# Patient Record
Sex: MALE | Race: WHITE | NOT HISPANIC OR LATINO | Employment: UNEMPLOYED | URBAN - METROPOLITAN AREA
[De-identification: names, ages, dates, MRNs, and addresses within clinical notes are randomized per-mention and may not be internally consistent; named-entity substitution may affect disease eponyms.]

---

## 2022-08-17 ENCOUNTER — HOSPITAL ENCOUNTER (EMERGENCY)
Facility: HOSPITAL | Age: 77
Discharge: HOME | End: 2022-08-17
Attending: EMERGENCY MEDICINE
Payer: MEDICARE

## 2022-08-17 VITALS
HEART RATE: 87 BPM | HEIGHT: 71 IN | RESPIRATION RATE: 16 BRPM | OXYGEN SATURATION: 98 % | WEIGHT: 195 LBS | SYSTOLIC BLOOD PRESSURE: 164 MMHG | BODY MASS INDEX: 27.3 KG/M2 | TEMPERATURE: 99 F | DIASTOLIC BLOOD PRESSURE: 91 MMHG

## 2022-08-17 DIAGNOSIS — R33.9 URINARY RETENTION: Primary | ICD-10-CM

## 2022-08-17 LAB
BILIRUB UR QL STRIP: NEGATIVE
CLARITY UR: CLEAR
COLOR UR: YELLOW
GLUCOSE UR QL STRIP: NEGATIVE
HGB UR QL STRIP: ABNORMAL
KETONES UR QL STRIP: NEGATIVE
LEUKOCYTE ESTERASE UR QL STRIP: NEGATIVE
MICROSCOPIC COMMENT: ABNORMAL
NITRITE UR QL STRIP: NEGATIVE
PH UR STRIP: 6 [PH] (ref 5–8)
PROT UR QL STRIP: NEGATIVE
RBC #/AREA URNS HPF: 10 /HPF (ref 0–4)
SP GR UR STRIP: 1.01 (ref 1–1.03)
URN SPEC COLLECT METH UR: ABNORMAL
UROBILINOGEN UR STRIP-ACNC: NEGATIVE EU/DL
WBC #/AREA URNS HPF: 0 /HPF (ref 0–5)

## 2022-08-17 PROCEDURE — 81000 URINALYSIS NONAUTO W/SCOPE: CPT | Performed by: EMERGENCY MEDICINE

## 2022-08-17 PROCEDURE — 99283 EMERGENCY DEPT VISIT LOW MDM: CPT | Mod: 25

## 2022-08-17 PROCEDURE — 51702 INSERT TEMP BLADDER CATH: CPT

## 2022-08-17 NOTE — ED NOTES
Pt states that he has not been able to urinate since Sunday. Pt states that he has been dribbling but not intentionally

## 2022-08-17 NOTE — ED NOTES
Pt provided with sandwich and drink pts states that he has not been able to eat in the last several days due to the abd pressure

## 2022-08-17 NOTE — ED PROVIDER NOTES
Encounter Date: 8/17/2022       History     Chief Complaint   Patient presents with    Urinary Retention     Patient states for past 2 days he has been having urinary retention and dribbling.  Patient also states he has had blood in his urine.     Pt with stated hx of BPH here with LAP and urine retention. Urine issues the past week but pain and no uop since last night. No hx of same. He noticed some blood in his urine yesterday. No back pain.    The history is provided by the patient.     Review of patient's allergies indicates:  No Known Allergies  No past medical history on file.  No past surgical history on file.  No family history on file.     Review of Systems   Constitutional: Negative for chills and fever.   Gastrointestinal: Positive for abdominal pain.   Genitourinary: Positive for decreased urine volume, difficulty urinating and hematuria. Negative for flank pain and testicular pain.   All other systems reviewed and are negative.      Physical Exam     Initial Vitals [08/17/22 1102]   BP Pulse Resp Temp SpO2   (!) 164/91 87 16 98.5 °F (36.9 °C) 98 %      MAP       --         Physical Exam    Nursing note and vitals reviewed.  Constitutional: He appears well-developed and well-nourished.   uncomfortable   Eyes: No scleral icterus.   Cardiovascular: Normal rate, regular rhythm, normal heart sounds and intact distal pulses.   Pulmonary/Chest: Breath sounds normal.   Abdominal: Abdomen is soft. Bowel sounds are normal.   +ttp to suprapubic area   Musculoskeletal:         General: No tenderness or edema. Normal range of motion.     Neurological: He is alert and oriented to person, place, and time.   Skin: Skin is warm and dry. Capillary refill takes less than 2 seconds. No rash noted. No erythema. No pallor.         ED Course   Procedures  Labs Reviewed   URINALYSIS, REFLEX TO URINE CULTURE - Abnormal; Notable for the following components:       Result Value    Occult Blood UA 1+ (*)     All other components  within normal limits    Narrative:     Preferred Collection Type->Urine, Clean Catch  Specimen Source->Urine   URINALYSIS MICROSCOPIC - Abnormal; Notable for the following components:    RBC, UA 10 (*)     All other components within normal limits    Narrative:     Preferred Collection Type->Urine, Clean Catch  Specimen Source->Urine          Imaging Results    None          Medications - No data to display  Medical Decision Making:   Differential Diagnosis:   UTI, BPH  Clinical Tests:   Lab Tests: Ordered and Reviewed  ED Management:  Pt with BPH presented with urinary retention and abd pain. Freire placed with relief of his pain. UA neg for infection. Pt to f/u with urology when he gets home to [place]. Will dc with the freire and leg bag.             ED Course as of 08/17/22 1202   Wed Aug 17, 2022   1120 Freire placed. Pt with relief of his pain. Will attempt to get urology f/u. Pt visiting from [place] and supposed to go home Friday.  [DC]      ED Course User Index  [DC] Issa Catalan Jr., MD             Clinical Impression:   Final diagnoses:  [R33.9] Urinary retention (Primary)          ED Disposition Condition    Discharge Stable        ED Prescriptions     None        Follow-up Information     Follow up With Specialties Details Why Contact Info    Urology clinic  On 8/22/2022             Issa Catalan Jr., MD  08/17/22 1202

## 2022-11-21 ENCOUNTER — HOSPITAL ENCOUNTER (EMERGENCY)
Facility: HOSPITAL | Age: 77
Discharge: ANOTHER HOSPITAL | End: 2022-11-22
Attending: INTERNAL MEDICINE
Payer: MEDICARE

## 2022-11-21 DIAGNOSIS — K80.20 GALLSTONES: ICD-10-CM

## 2022-11-21 DIAGNOSIS — K92.2 UPPER GI BLEED: ICD-10-CM

## 2022-11-21 DIAGNOSIS — N20.0 CALCULOUS PYELONEPHRITIS: Primary | ICD-10-CM

## 2022-11-21 DIAGNOSIS — R11.2 NAUSEA & VOMITING: ICD-10-CM

## 2022-11-21 DIAGNOSIS — N13.2 HYDRONEPHROSIS WITH URINARY OBSTRUCTION DUE TO URETERAL CALCULUS: ICD-10-CM

## 2022-11-21 DIAGNOSIS — N32.0 BLADDER OUTLET OBSTRUCTION: ICD-10-CM

## 2022-11-21 LAB
ALBUMIN SERPL BCP-MCNC: 4.4 G/DL (ref 3.5–5.2)
ALP SERPL-CCNC: 84 U/L (ref 55–135)
ALT SERPL W/O P-5'-P-CCNC: 17 U/L (ref 10–44)
ANION GAP SERPL CALC-SCNC: 12 MMOL/L (ref 8–16)
AST SERPL-CCNC: 18 U/L (ref 10–40)
BACTERIA #/AREA URNS HPF: ABNORMAL /HPF
BASOPHILS # BLD AUTO: 0.03 K/UL (ref 0–0.2)
BASOPHILS NFR BLD: 0.2 % (ref 0–1.9)
BILIRUB SERPL-MCNC: 1 MG/DL (ref 0.1–1)
BILIRUB UR QL STRIP: NEGATIVE
BUN SERPL-MCNC: 21 MG/DL (ref 8–23)
CALCIUM SERPL-MCNC: 9.5 MG/DL (ref 8.7–10.5)
CHLORIDE SERPL-SCNC: 108 MMOL/L (ref 95–110)
CLARITY UR: CLEAR
CO2 SERPL-SCNC: 23 MMOL/L (ref 23–29)
COLOR UR: YELLOW
CREAT SERPL-MCNC: 1.5 MG/DL (ref 0.5–1.4)
DIFFERENTIAL METHOD: ABNORMAL
EOSINOPHIL # BLD AUTO: 0 K/UL (ref 0–0.5)
EOSINOPHIL NFR BLD: 0 % (ref 0–8)
ERYTHROCYTE [DISTWIDTH] IN BLOOD BY AUTOMATED COUNT: 15.5 % (ref 11.5–14.5)
EST. GFR  (NO RACE VARIABLE): 48 ML/MIN/1.73 M^2
GLUCOSE SERPL-MCNC: 113 MG/DL (ref 70–110)
GLUCOSE UR QL STRIP: NEGATIVE
HCT VFR BLD AUTO: 45.7 % (ref 40–54)
HGB BLD-MCNC: 14.9 G/DL (ref 14–18)
HGB UR QL STRIP: ABNORMAL
HYALINE CASTS #/AREA URNS LPF: 0 /LPF
IMM GRANULOCYTES # BLD AUTO: 0.07 K/UL (ref 0–0.04)
IMM GRANULOCYTES NFR BLD AUTO: 0.4 % (ref 0–0.5)
INR PPP: 1 (ref 0.8–1.2)
KETONES UR QL STRIP: ABNORMAL
LEUKOCYTE ESTERASE UR QL STRIP: ABNORMAL
LIPASE SERPL-CCNC: 13 U/L (ref 4–60)
LYMPHOCYTES # BLD AUTO: 0.5 K/UL (ref 1–4.8)
LYMPHOCYTES NFR BLD: 2.8 % (ref 18–48)
MCH RBC QN AUTO: 28.4 PG (ref 27–31)
MCHC RBC AUTO-ENTMCNC: 32.6 G/DL (ref 32–36)
MCV RBC AUTO: 87 FL (ref 82–98)
MICROSCOPIC COMMENT: ABNORMAL
MONOCYTES # BLD AUTO: 0.9 K/UL (ref 0.3–1)
MONOCYTES NFR BLD: 5.6 % (ref 4–15)
NEUTROPHILS # BLD AUTO: 14.7 K/UL (ref 1.8–7.7)
NEUTROPHILS NFR BLD: 91 % (ref 38–73)
NITRITE UR QL STRIP: POSITIVE
NRBC BLD-RTO: 0 /100 WBC
PH UR STRIP: 7 [PH] (ref 5–8)
PLATELET # BLD AUTO: 273 K/UL (ref 150–450)
PMV BLD AUTO: 11.1 FL (ref 9.2–12.9)
POTASSIUM SERPL-SCNC: 4.4 MMOL/L (ref 3.5–5.1)
PROT SERPL-MCNC: 7.1 G/DL (ref 6–8.4)
PROT UR QL STRIP: ABNORMAL
PROTHROMBIN TIME: 10.9 SEC (ref 9–12.5)
RBC # BLD AUTO: 5.25 M/UL (ref 4.6–6.2)
RBC #/AREA URNS HPF: 20 /HPF (ref 0–4)
SODIUM SERPL-SCNC: 143 MMOL/L (ref 136–145)
SP GR UR STRIP: 1.02 (ref 1–1.03)
URN SPEC COLLECT METH UR: ABNORMAL
UROBILINOGEN UR STRIP-ACNC: NEGATIVE EU/DL
WBC # BLD AUTO: 16.2 K/UL (ref 3.9–12.7)
WBC #/AREA URNS HPF: 80 /HPF (ref 0–5)

## 2022-11-21 PROCEDURE — 76705 ECHO EXAM OF ABDOMEN: CPT | Mod: TC

## 2022-11-21 PROCEDURE — 76705 US ABDOMEN LIMITED: ICD-10-PCS | Mod: 26,,, | Performed by: RADIOLOGY

## 2022-11-21 PROCEDURE — 87086 URINE CULTURE/COLONY COUNT: CPT | Performed by: INTERNAL MEDICINE

## 2022-11-21 PROCEDURE — 25000003 PHARM REV CODE 250: Performed by: EMERGENCY MEDICINE

## 2022-11-21 PROCEDURE — 87077 CULTURE AEROBIC IDENTIFY: CPT | Performed by: INTERNAL MEDICINE

## 2022-11-21 PROCEDURE — 85025 COMPLETE CBC W/AUTO DIFF WBC: CPT | Performed by: INTERNAL MEDICINE

## 2022-11-21 PROCEDURE — 87186 SC STD MICRODIL/AGAR DIL: CPT | Mod: 59 | Performed by: INTERNAL MEDICINE

## 2022-11-21 PROCEDURE — 74176 CT RENAL STONE STUDY ABD PELVIS WO: ICD-10-PCS | Mod: 26,,, | Performed by: RADIOLOGY

## 2022-11-21 PROCEDURE — 93005 ELECTROCARDIOGRAM TRACING: CPT

## 2022-11-21 PROCEDURE — 81000 URINALYSIS NONAUTO W/SCOPE: CPT | Performed by: INTERNAL MEDICINE

## 2022-11-21 PROCEDURE — 76705 ECHO EXAM OF ABDOMEN: CPT | Mod: 26,,, | Performed by: RADIOLOGY

## 2022-11-21 PROCEDURE — 74176 CT ABD & PELVIS W/O CONTRAST: CPT | Mod: 26,,, | Performed by: RADIOLOGY

## 2022-11-21 PROCEDURE — 93010 ELECTROCARDIOGRAM REPORT: CPT | Mod: ,,, | Performed by: INTERNAL MEDICINE

## 2022-11-21 PROCEDURE — 63600175 PHARM REV CODE 636 W HCPCS: Performed by: INTERNAL MEDICINE

## 2022-11-21 PROCEDURE — 93010 EKG 12-LEAD: ICD-10-PCS | Mod: ,,, | Performed by: INTERNAL MEDICINE

## 2022-11-21 PROCEDURE — 96365 THER/PROPH/DIAG IV INF INIT: CPT

## 2022-11-21 PROCEDURE — 96361 HYDRATE IV INFUSION ADD-ON: CPT

## 2022-11-21 PROCEDURE — 85610 PROTHROMBIN TIME: CPT | Performed by: INTERNAL MEDICINE

## 2022-11-21 PROCEDURE — 71045 XR CHEST AP PORTABLE: ICD-10-PCS | Mod: 26,,, | Performed by: RADIOLOGY

## 2022-11-21 PROCEDURE — 74176 CT ABD & PELVIS W/O CONTRAST: CPT | Mod: TC

## 2022-11-21 PROCEDURE — 87088 URINE BACTERIA CULTURE: CPT | Performed by: INTERNAL MEDICINE

## 2022-11-21 PROCEDURE — 80053 COMPREHEN METABOLIC PANEL: CPT | Performed by: INTERNAL MEDICINE

## 2022-11-21 PROCEDURE — 25000003 PHARM REV CODE 250: Performed by: INTERNAL MEDICINE

## 2022-11-21 PROCEDURE — C9113 INJ PANTOPRAZOLE SODIUM, VIA: HCPCS | Performed by: INTERNAL MEDICINE

## 2022-11-21 PROCEDURE — 86803 HEPATITIS C AB TEST: CPT | Performed by: EMERGENCY MEDICINE

## 2022-11-21 PROCEDURE — 83690 ASSAY OF LIPASE: CPT | Performed by: INTERNAL MEDICINE

## 2022-11-21 PROCEDURE — 87389 HIV-1 AG W/HIV-1&-2 AB AG IA: CPT | Performed by: EMERGENCY MEDICINE

## 2022-11-21 PROCEDURE — 71045 X-RAY EXAM CHEST 1 VIEW: CPT | Mod: 26,,, | Performed by: RADIOLOGY

## 2022-11-21 PROCEDURE — 71045 X-RAY EXAM CHEST 1 VIEW: CPT | Mod: TC

## 2022-11-21 PROCEDURE — 96375 TX/PRO/DX INJ NEW DRUG ADDON: CPT

## 2022-11-21 PROCEDURE — 99285 EMERGENCY DEPT VISIT HI MDM: CPT | Mod: 25

## 2022-11-21 RX ORDER — PANTOPRAZOLE SODIUM 40 MG/10ML
40 INJECTION, POWDER, LYOPHILIZED, FOR SOLUTION INTRAVENOUS
Status: COMPLETED | OUTPATIENT
Start: 2022-11-21 | End: 2022-11-21

## 2022-11-21 RX ORDER — ONDANSETRON 2 MG/ML
4 INJECTION INTRAMUSCULAR; INTRAVENOUS
Status: COMPLETED | OUTPATIENT
Start: 2022-11-21 | End: 2022-11-21

## 2022-11-21 RX ORDER — TAMSULOSIN HYDROCHLORIDE 0.4 MG/1
CAPSULE ORAL DAILY
COMMUNITY

## 2022-11-21 RX ORDER — ACETAMINOPHEN 500 MG
1000 TABLET ORAL
Status: COMPLETED | OUTPATIENT
Start: 2022-11-21 | End: 2022-11-21

## 2022-11-21 RX ADMIN — SODIUM CHLORIDE 1000 ML: 0.9 INJECTION, SOLUTION INTRAVENOUS at 03:11

## 2022-11-21 RX ADMIN — ACETAMINOPHEN 1000 MG: 500 TABLET ORAL at 06:11

## 2022-11-21 RX ADMIN — PANTOPRAZOLE SODIUM 40 MG: 40 INJECTION, POWDER, FOR SOLUTION INTRAVENOUS at 04:11

## 2022-11-21 RX ADMIN — ONDANSETRON 4 MG: 2 INJECTION INTRAMUSCULAR; INTRAVENOUS at 03:11

## 2022-11-21 RX ADMIN — CEFTRIAXONE 1 G: 1 INJECTION, SOLUTION INTRAVENOUS at 04:11

## 2022-11-21 NOTE — ED NOTES
Care assumed of pt. Pt Aox4; respirations even, unlabored. Pt c/o RLQ abd pain. Burgos catheter in place. No drainage bag in place. Pt has been using cap to plug end of catheter. States he empties catheter every couple of hours. Pt also reports 1 episode of dark red vomit. W/u in progress.

## 2022-11-21 NOTE — ED TRIAGE NOTES
Pt c/o RLQ abdominal pain with 1 episode of vomiting dark red blood. Pt states had lithotripsy 1 week ago. Currently has indwelling freire cath

## 2022-11-21 NOTE — ED PROVIDER NOTES
Encounter Date: 11/21/2022       History     Chief Complaint   Patient presents with    Abdominal Pain    Hematemesis     Patient comes in complaining of right flank and groin pain that occurred this morning.  He had episode of nausea vomiting with blood in the vomitus.  No history of any bowel movements of blood or history of ulcer disease.  Patient has indwelling Burgos catheter because of BPH and urinary obstruction.      The patient is from [place], he has been followed by Neurology there for obstructive uropathy as well as right kidney stones.  The patient was diagnosed with right renal 6 mm stone 2 weeks ago by his urologist there.  Even those in the kidney he still underwent lithotripsy on last Tuesday.  He states he may have seen fragments but he does not know and has no follow-up since then.  He has been offered surgery by his urologist including UroLift which he is option to take and pursue in the future.  There is no history any fever chills and he has been off antibiotics for over 1 week.      Review of patient's allergies indicates:  No Known Allergies  History reviewed. No pertinent past medical history.  History reviewed. No pertinent surgical history.  History reviewed. No pertinent family history.  Social History     Tobacco Use    Smoking status: Never    Smokeless tobacco: Never   Substance Use Topics    Alcohol use: Not Currently    Drug use: Never     Review of Systems   Constitutional:  Negative for fever.   HENT:  Negative for sore throat.    Respiratory:  Negative for shortness of breath.    Cardiovascular:  Negative for chest pain.   Gastrointestinal:  Negative for nausea.   Genitourinary:  Negative for dysuria.   Musculoskeletal:  Negative for back pain.   Skin:  Negative for rash.   Neurological:  Negative for weakness.   Hematological:  Does not bruise/bleed easily.     Physical Exam     Initial Vitals [11/21/22 1510]   BP Pulse Resp Temp SpO2   (!) 148/76 75 16 97.8 °F (36.6 °C) 98 %       MAP       --         Physical Exam    Nursing note and vitals reviewed.  Constitutional: He appears well-developed.   HENT:   Head: Normocephalic.   Eyes: Pupils are equal, round, and reactive to light.   Neck:   Normal range of motion.  Cardiovascular:  Normal rate.           Pulmonary/Chest: Breath sounds normal.   Abdominal: Abdomen is soft and protuberant. Bowel sounds are decreased. There is abdominal tenderness.   Patient with indwelling Burgos catheter   There is right CVA tenderness.There is guarding. There is no rebound.   Musculoskeletal:         General: Normal range of motion.      Cervical back: Normal range of motion.     Neurological: He is alert. He has normal reflexes.   Skin: Skin is warm.       ED Course   Procedures  Labs Reviewed   CBC W/ AUTO DIFFERENTIAL - Abnormal; Notable for the following components:       Result Value    WBC 16.20 (*)     RDW 15.5 (*)     Gran # (ANC) 14.7 (*)     Immature Grans (Abs) 0.07 (*)     Lymph # 0.5 (*)     Gran % 91.0 (*)     Lymph % 2.8 (*)     All other components within normal limits    Narrative:     Release to patient->Immediate   COMPREHENSIVE METABOLIC PANEL - Abnormal; Notable for the following components:    Glucose 113 (*)     Creatinine 1.5 (*)     eGFR 48.0 (*)     All other components within normal limits    Narrative:     Release to patient->Immediate   URINALYSIS, REFLEX TO URINE CULTURE - Abnormal; Notable for the following components:    Protein, UA 1+ (*)     Ketones, UA 2+ (*)     Occult Blood UA 2+ (*)     Nitrite, UA Positive (*)     Leukocytes, UA 1+ (*)     All other components within normal limits    Narrative:     Preferred Collection Type->Urine, Clean Catch  Specimen Source->Urine   URINALYSIS MICROSCOPIC - Abnormal; Notable for the following components:    RBC, UA 20 (*)     WBC, UA 80 (*)     Bacteria Many (*)     All other components within normal limits    Narrative:     Preferred Collection Type->Urine, Clean Catch  Specimen  Source->Urine   CULTURE, URINE   LIPASE    Narrative:     Release to patient->Immediate   PROTIME-INR    Narrative:     Release to patient->Immediate   HIV 1 / 2 ANTIBODY   HEPATITIS C ANTIBODY        ECG Results              EKG 12-lead (Preliminary result)  Result time 11/21/22 15:52:51      ED Interpretation by Terell Hamm MD (11/21/22 15:52:51, [facility], Emergency Medicine)    First-degree AV block with a heart rate of 80 and no acute ischemic changes                                  Imaging Results              US Abdomen Limited (Final result)  Result time 11/21/22 16:32:02      Final result by Cheikh Trotter MD (11/21/22 16:32:02)                   Impression:      1. Cholelithiasis without sonographic evidence to suggest acute cholecystitis.  2. Mild right-sided hydronephrosis.  3. Poor visualization of the pancreas and IVC.      Electronically signed by: Cheikh Trotter  Date:    11/21/2022  Time:    16:32               Narrative:    EXAMINATION:  US ABDOMEN LIMITED    CLINICAL HISTORY:  Gallstones;    TECHNIQUE:  Limited ultrasound of the right upper quadrant of the abdomen (including pancreas, liver, gallbladder, common bile duct, and right kidney) was performed.    COMPARISON:  CT same day.    FINDINGS:  Liver: Normal in size, measuring 18.0 cm. Homogeneous echotexture. No focal hepatic lesions.    Gallbladder: Normally distended with multiple calcified gallstones.  No gallbladder wall thickening or pericholecystic fluid.  No sonographic Valles's sign.    Pancreas: Obscured by overlying bowel gas.    Biliary system: The common duct is not dilated, measuring 4.0 mm.  No intrahepatic ductal dilatation.    Right kidney: Normal in size and echogenicity measuring 11.3 centimeters.  Mild changes of hydronephrosis.    IVC is obscured by overlying bowel gas.    Miscellaneous: No upper abdominal ascites.                                       X-Ray Chest AP Portable (Final result)  Result  time 11/21/22 15:48:14      Final result by Cheikh Trotter MD (11/21/22 15:48:14)                   Impression:      No acute chest disease.      Electronically signed by: Cheikh Trotter  Date:    11/21/2022  Time:    15:48               Narrative:    EXAMINATION:  XR CHEST AP PORTABLE    CLINICAL HISTORY:  Vomiting;    TECHNIQUE:  Portable view of the chest was performed.    COMPARISON:  None.    FINDINGS:  Lungs are clear.  No focal consolidation.  Heart size normal.  Mediastinal contours unremarkable.  Trachea midline.    Bony thorax intact.                                        CT Renal Stone Study ABD Pelvis WO (Final result)  Result time 11/21/22 15:53:29      Final result by Cheikh Trotter MD (11/21/22 15:53:29)                   Impression:      1. Small hiatal hernia.  2. Cholelithiasis.  Further evaluation with ultrasound as deemed clinically necessary.  3. Previous left adrenalectomy.  4. Moderate right-sided hydronephrosis and hydroureter secondary to partially obstructing adjacent distal right ureteral calculi.  5. Small nonobstructing right renal calculi.  6.  Mild right perinephric and periureteric inflammatory change may represent underlying UTI/pyelonephritis.  Correlate clinically with UA.  7. Diverticulosis.  8. Significant prostatic hypertrophy.  Further evaluation as deemed clinically necessary.  9. Burgos catheter.  This report was flagged in Epic as abnormal.      Electronically signed by: Cheikh Trotter  Date:    11/21/2022  Time:    15:53               Narrative:    EXAMINATION:  CT RENAL STONE STUDY ABD PELVIS WO    CLINICAL HISTORY:  Bladder-neck obstruction;    TECHNIQUE:  Low dose axial images, sagittal and coronal reformations were obtained from the lung bases to the pubic symphysis.  Contrast was not administered.    COMPARISON:  None    FINDINGS:  There is a small hiatal hernia.  The liver and spleen are normal in size and attenuation.  The gallbladder is distended with  multiple calcified gallstones.  The pancreas and right adrenal gland are unremarkable.  Surgical clips from previous left adrenalectomy.    Kidneys are normal in size and attenuation.  There is moderate right-sided hydronephrosis with hydroureter secondary to partially obstructing adjacent 5 and 6 mm distal right ureteral calculi.  There are small nonobstructing right renal calculi measuring 4-5 mm in diameter.  Mild right perinephric and right periureteric inflammatory change.  No left renal calculi.  No left-sided changes of hydronephrosis.  The left ureter is normal in course and caliber.    Air and stool throughout the colon and rectum.  Numerous diverticula throughout predominantly the distal colon.  No mesenteric inflammatory change.  The appendix is normal in caliber.    The bladder is partially distended with a Burgos catheter in place.  The prostate is significantly enlarged with coarse peripheral calcification measuring 5.9 cm AP x 6.3 cm transverse.  This results in elevation of the bladder floor.  Seminal vesicles and rectum unremarkable.    There is no significant mesenteric or retroperitoneal lymphadenopathy.                                       Medications   pantoprazole injection 40 mg (has no administration in time range)   sodium chloride 0.9% bolus 1,000 mL (0 mLs Intravenous Stopped 11/21/22 1630)   ondansetron injection 4 mg (4 mg Intravenous Given 11/21/22 1531)   cefTRIAXone (ROCEPHIN) 1 g/50 mL D5W IVPB (0 g Intravenous Stopped 11/21/22 1639)     Medical Decision Making:   ED Management:  Patient with CT scan showing both gallstones with a dilated gallbladder, pyelonephritis, right hydronephrosis with obstruction, with labs suggesting cystitis as well.  Because patient had recent lithotripsy less than 1 week ago, and patient is obstructed with infection will need to transfer to Urology.  Will also need to see surgery for gallbladder which may also be infected as well.    Patient care assumed  from Dr. Hamm at shift change to await acceptance at another facility, where patient will need nephrology services and possibly General surgery or GI.  Over the course of patient's stay here, under my care, I spoke to Urology at [facility] who agreed to see the patient in consult, but wanted the patient admitted to Hospital Medicine.  Eventually there were no beds available.  I did eventually speak to Dr.Charit Wise who arranged to have the patient admitted [facility].  Under my care, the patient has been stable and pain-free.  He did receive IV antibiotics and IV fluids.           ED Course as of 11/21/22 1646   Mon Nov 21, 2022   1543 CBC auto differential(!) [PW]   1551 Urinalysis, Reflex to Urine Culture Urine, Clean Catch(!) [PW]   1551 RBC, UA(!): 20 [PW]   1551 WBC, UA(!): 80 [PW]   1551 Bacteria, UA(!): Many [PW]   1551 NITRITE UA(!): Positive [PW]   1551 Leukocytes, UA(!): 1+ [PW]   1552 X-Ray Chest AP Portable [PW]   1552 EKG 12-lead [PW]   1559 CT Renal Stone Study ABD Pelvis WO(!) [PW]   1559 Protime: 10.9 [PW]   1559 INR: 1.0 [PW]   1603 Lipase: 13 [PW]   1603 Comprehensive metabolic panel(!) [PW]   1639 CT Renal Stone Study ABD Pelvis WO(!) [PW]      ED Course User Index  [PW] Terell Hmam MD                   Clinical Impression:   Final diagnoses:  [R11.2] Nausea & vomiting  [N13.2] Hydronephrosis with urinary obstruction due to ureteral calculus  [N20.0] Calculous pyelonephritis (Primary)  [N32.0] Bladder outlet obstruction  [K80.20] Gallstones  [K92.2] Upper GI bleed      ED Disposition Condition    Transfer to Another Facility Stable                Pal Dan MD  11/22/22 9602

## 2022-11-22 ENCOUNTER — HOSPITAL ENCOUNTER (INPATIENT)
Facility: HOSPITAL | Age: 77
LOS: 2 days | Discharge: HOME | DRG: 661 | End: 2022-11-24
Attending: STUDENT IN AN ORGANIZED HEALTH CARE EDUCATION/TRAINING PROGRAM | Admitting: STUDENT IN AN ORGANIZED HEALTH CARE EDUCATION/TRAINING PROGRAM
Payer: MEDICARE

## 2022-11-22 ENCOUNTER — ANESTHESIA EVENT (OUTPATIENT)
Dept: SURGERY | Facility: HOSPITAL | Age: 77
DRG: 661 | End: 2022-11-22
Payer: MEDICARE

## 2022-11-22 ENCOUNTER — ANESTHESIA (OUTPATIENT)
Dept: SURGERY | Facility: HOSPITAL | Age: 77
DRG: 661 | End: 2022-11-22
Payer: MEDICARE

## 2022-11-22 VITALS
HEART RATE: 77 BPM | TEMPERATURE: 99 F | SYSTOLIC BLOOD PRESSURE: 112 MMHG | WEIGHT: 190 LBS | HEIGHT: 71 IN | RESPIRATION RATE: 16 BRPM | DIASTOLIC BLOOD PRESSURE: 45 MMHG | OXYGEN SATURATION: 97 % | BODY MASS INDEX: 26.6 KG/M2

## 2022-11-22 DIAGNOSIS — N13.9 OBSTRUCTIVE UROPATHY: Primary | ICD-10-CM

## 2022-11-22 DIAGNOSIS — N12 PYELONEPHRITIS: ICD-10-CM

## 2022-11-22 DIAGNOSIS — R07.9 CHEST PAIN: ICD-10-CM

## 2022-11-22 PROBLEM — N20.0 NEPHROLITHIASIS: Status: ACTIVE | Noted: 2022-11-22

## 2022-11-22 PROBLEM — N17.9 AKI (ACUTE KIDNEY INJURY): Status: ACTIVE | Noted: 2022-11-22

## 2022-11-22 PROBLEM — E78.5 HYPERLIPIDEMIA: Status: ACTIVE | Noted: 2022-11-22

## 2022-11-22 LAB
ABO + RH BLD: NORMAL
ANION GAP SERPL CALC-SCNC: 9 MMOL/L (ref 8–16)
BASOPHILS # BLD AUTO: 0.02 K/UL (ref 0–0.2)
BASOPHILS NFR BLD: 0.2 % (ref 0–1.9)
BLD GP AB SCN CELLS X3 SERPL QL: NORMAL
BUN SERPL-MCNC: 23 MG/DL (ref 8–23)
CALCIUM SERPL-MCNC: 8.2 MG/DL (ref 8.7–10.5)
CHLORIDE SERPL-SCNC: 110 MMOL/L (ref 95–110)
CO2 SERPL-SCNC: 21 MMOL/L (ref 23–29)
CREAT SERPL-MCNC: 1.9 MG/DL (ref 0.5–1.4)
DIFFERENTIAL METHOD: ABNORMAL
EOSINOPHIL # BLD AUTO: 0 K/UL (ref 0–0.5)
EOSINOPHIL NFR BLD: 0 % (ref 0–8)
ERYTHROCYTE [DISTWIDTH] IN BLOOD BY AUTOMATED COUNT: 16 % (ref 11.5–14.5)
EST. GFR  (NO RACE VARIABLE): 36 ML/MIN/1.73 M^2
GLUCOSE SERPL-MCNC: 116 MG/DL (ref 70–110)
HCT VFR BLD AUTO: 40.2 % (ref 40–54)
HCV AB SERPL QL IA: NORMAL
HGB BLD-MCNC: 12.8 G/DL (ref 14–18)
HIV 1+2 AB+HIV1 P24 AG SERPL QL IA: NORMAL
IMM GRANULOCYTES # BLD AUTO: 0.04 K/UL (ref 0–0.04)
IMM GRANULOCYTES NFR BLD AUTO: 0.3 % (ref 0–0.5)
LYMPHOCYTES # BLD AUTO: 0.2 K/UL (ref 1–4.8)
LYMPHOCYTES NFR BLD: 1.4 % (ref 18–48)
MAGNESIUM SERPL-MCNC: 1.6 MG/DL (ref 1.6–2.6)
MCH RBC QN AUTO: 27.8 PG (ref 27–31)
MCHC RBC AUTO-ENTMCNC: 31.8 G/DL (ref 32–36)
MCV RBC AUTO: 87 FL (ref 82–98)
MONOCYTES # BLD AUTO: 0.8 K/UL (ref 0.3–1)
MONOCYTES NFR BLD: 6.7 % (ref 4–15)
NEUTROPHILS # BLD AUTO: 10.8 K/UL (ref 1.8–7.7)
NEUTROPHILS NFR BLD: 91.4 % (ref 38–73)
NRBC BLD-RTO: 0 /100 WBC
PHOSPHATE SERPL-MCNC: 2.4 MG/DL (ref 2.7–4.5)
PLATELET # BLD AUTO: 203 K/UL (ref 150–450)
PMV BLD AUTO: 11.4 FL (ref 9.2–12.9)
POTASSIUM SERPL-SCNC: 4.1 MMOL/L (ref 3.5–5.1)
RBC # BLD AUTO: 4.61 M/UL (ref 4.6–6.2)
SODIUM SERPL-SCNC: 140 MMOL/L (ref 136–145)
WBC # BLD AUTO: 11.87 K/UL (ref 3.9–12.7)

## 2022-11-22 PROCEDURE — 25000003 PHARM REV CODE 250: Performed by: UROLOGY

## 2022-11-22 PROCEDURE — 37000009 HC ANESTHESIA EA ADD 15 MINS: Performed by: UROLOGY

## 2022-11-22 PROCEDURE — 36000707: Performed by: UROLOGY

## 2022-11-22 PROCEDURE — 71000033 HC RECOVERY, INTIAL HOUR: Performed by: UROLOGY

## 2022-11-22 PROCEDURE — 63600175 PHARM REV CODE 636 W HCPCS: Performed by: STUDENT IN AN ORGANIZED HEALTH CARE EDUCATION/TRAINING PROGRAM

## 2022-11-22 PROCEDURE — D9220A PRA ANESTHESIA: ICD-10-PCS | Mod: CRNA,,, | Performed by: STUDENT IN AN ORGANIZED HEALTH CARE EDUCATION/TRAINING PROGRAM

## 2022-11-22 PROCEDURE — 25000003 PHARM REV CODE 250: Performed by: STUDENT IN AN ORGANIZED HEALTH CARE EDUCATION/TRAINING PROGRAM

## 2022-11-22 PROCEDURE — 36415 COLL VENOUS BLD VENIPUNCTURE: CPT | Performed by: STUDENT IN AN ORGANIZED HEALTH CARE EDUCATION/TRAINING PROGRAM

## 2022-11-22 PROCEDURE — 36000706: Performed by: UROLOGY

## 2022-11-22 PROCEDURE — 99223 1ST HOSP IP/OBS HIGH 75: CPT | Mod: 25,,, | Performed by: UROLOGY

## 2022-11-22 PROCEDURE — 94799 UNLISTED PULMONARY SVC/PX: CPT

## 2022-11-22 PROCEDURE — 84100 ASSAY OF PHOSPHORUS: CPT | Performed by: STUDENT IN AN ORGANIZED HEALTH CARE EDUCATION/TRAINING PROGRAM

## 2022-11-22 PROCEDURE — 86850 RBC ANTIBODY SCREEN: CPT | Performed by: STUDENT IN AN ORGANIZED HEALTH CARE EDUCATION/TRAINING PROGRAM

## 2022-11-22 PROCEDURE — D9220A PRA ANESTHESIA: Mod: ANES,,, | Performed by: ANESTHESIOLOGY

## 2022-11-22 PROCEDURE — C1769 GUIDE WIRE: HCPCS | Performed by: UROLOGY

## 2022-11-22 PROCEDURE — 37000008 HC ANESTHESIA 1ST 15 MINUTES: Performed by: UROLOGY

## 2022-11-22 PROCEDURE — 99223 PR INITIAL HOSPITAL CARE,LEVL III: ICD-10-PCS | Mod: 25,,, | Performed by: UROLOGY

## 2022-11-22 PROCEDURE — 52332 CYSTOSCOPY AND TREATMENT: CPT | Mod: RT,,, | Performed by: UROLOGY

## 2022-11-22 PROCEDURE — 25000003 PHARM REV CODE 250: Performed by: EMERGENCY MEDICINE

## 2022-11-22 PROCEDURE — C2617 STENT, NON-COR, TEM W/O DEL: HCPCS | Performed by: UROLOGY

## 2022-11-22 PROCEDURE — 52332 PR CYSTOSCOPY,INSERT URETERAL STENT: ICD-10-PCS | Mod: RT,,, | Performed by: UROLOGY

## 2022-11-22 PROCEDURE — 85025 COMPLETE CBC W/AUTO DIFF WBC: CPT | Performed by: STUDENT IN AN ORGANIZED HEALTH CARE EDUCATION/TRAINING PROGRAM

## 2022-11-22 PROCEDURE — 63600175 PHARM REV CODE 636 W HCPCS: Performed by: ANESTHESIOLOGY

## 2022-11-22 PROCEDURE — 63600175 PHARM REV CODE 636 W HCPCS: Performed by: UROLOGY

## 2022-11-22 PROCEDURE — 83735 ASSAY OF MAGNESIUM: CPT | Performed by: STUDENT IN AN ORGANIZED HEALTH CARE EDUCATION/TRAINING PROGRAM

## 2022-11-22 PROCEDURE — 11000001 HC ACUTE MED/SURG PRIVATE ROOM

## 2022-11-22 PROCEDURE — 80048 BASIC METABOLIC PNL TOTAL CA: CPT | Performed by: STUDENT IN AN ORGANIZED HEALTH CARE EDUCATION/TRAINING PROGRAM

## 2022-11-22 PROCEDURE — D9220A PRA ANESTHESIA: ICD-10-PCS | Mod: ANES,,, | Performed by: ANESTHESIOLOGY

## 2022-11-22 PROCEDURE — D9220A PRA ANESTHESIA: Mod: CRNA,,, | Performed by: STUDENT IN AN ORGANIZED HEALTH CARE EDUCATION/TRAINING PROGRAM

## 2022-11-22 PROCEDURE — 27000221 HC OXYGEN, UP TO 24 HOURS

## 2022-11-22 DEVICE — STENT URET PERCUFLEX 6FR 26CM: Type: IMPLANTABLE DEVICE | Site: URETER | Status: FUNCTIONAL

## 2022-11-22 RX ORDER — FENTANYL CITRATE 50 UG/ML
INJECTION, SOLUTION INTRAMUSCULAR; INTRAVENOUS
Status: DISCONTINUED | OUTPATIENT
Start: 2022-11-22 | End: 2022-11-22

## 2022-11-22 RX ORDER — LANOLIN ALCOHOL/MO/W.PET/CERES
800 CREAM (GRAM) TOPICAL
Status: DISCONTINUED | OUTPATIENT
Start: 2022-11-22 | End: 2022-11-24 | Stop reason: HOSPADM

## 2022-11-22 RX ORDER — ASPIRIN 81 MG/1
81 TABLET ORAL DAILY
COMMUNITY

## 2022-11-22 RX ORDER — OXYBUTYNIN CHLORIDE 5 MG/1
5 TABLET ORAL 3 TIMES DAILY
Status: DISCONTINUED | OUTPATIENT
Start: 2022-11-22 | End: 2022-11-24 | Stop reason: HOSPADM

## 2022-11-22 RX ORDER — PROCHLORPERAZINE EDISYLATE 5 MG/ML
5 INJECTION INTRAMUSCULAR; INTRAVENOUS EVERY 6 HOURS PRN
Status: DISCONTINUED | OUTPATIENT
Start: 2022-11-22 | End: 2022-11-24 | Stop reason: HOSPADM

## 2022-11-22 RX ORDER — MAGNESIUM 200 MG
100 TABLET ORAL EVERY MORNING
COMMUNITY

## 2022-11-22 RX ORDER — GLUCAGON 1 MG
1 KIT INJECTION
Status: DISCONTINUED | OUTPATIENT
Start: 2022-11-22 | End: 2022-11-24 | Stop reason: HOSPADM

## 2022-11-22 RX ORDER — IBUPROFEN 200 MG
16 TABLET ORAL
Status: DISCONTINUED | OUTPATIENT
Start: 2022-11-22 | End: 2022-11-24 | Stop reason: HOSPADM

## 2022-11-22 RX ORDER — MORPHINE SULFATE 4 MG/ML
4 INJECTION, SOLUTION INTRAMUSCULAR; INTRAVENOUS EVERY 6 HOURS PRN
Status: DISCONTINUED | OUTPATIENT
Start: 2022-11-22 | End: 2022-11-24 | Stop reason: HOSPADM

## 2022-11-22 RX ORDER — POLYETHYLENE GLYCOL 3350 17 G/17G
17 POWDER, FOR SOLUTION ORAL DAILY
Status: DISCONTINUED | OUTPATIENT
Start: 2022-11-22 | End: 2022-11-24 | Stop reason: HOSPADM

## 2022-11-22 RX ORDER — SODIUM CHLORIDE 9 MG/ML
1000 INJECTION, SOLUTION INTRAVENOUS
Status: COMPLETED | OUTPATIENT
Start: 2022-11-22 | End: 2022-11-22

## 2022-11-22 RX ORDER — ACETAMINOPHEN 325 MG/1
650 TABLET ORAL EVERY 8 HOURS PRN
Status: DISCONTINUED | OUTPATIENT
Start: 2022-11-22 | End: 2022-11-24 | Stop reason: HOSPADM

## 2022-11-22 RX ORDER — PROPOFOL 10 MG/ML
VIAL (ML) INTRAVENOUS
Status: DISCONTINUED | OUTPATIENT
Start: 2022-11-22 | End: 2022-11-22

## 2022-11-22 RX ORDER — NALOXONE HCL 0.4 MG/ML
0.02 VIAL (ML) INJECTION
Status: DISCONTINUED | OUTPATIENT
Start: 2022-11-22 | End: 2022-11-24 | Stop reason: HOSPADM

## 2022-11-22 RX ORDER — HEPARIN SODIUM 5000 [USP'U]/ML
5000 INJECTION, SOLUTION INTRAVENOUS; SUBCUTANEOUS EVERY 8 HOURS
Status: DISCONTINUED | OUTPATIENT
Start: 2022-11-22 | End: 2022-11-24 | Stop reason: HOSPADM

## 2022-11-22 RX ORDER — ACETAMINOPHEN 325 MG/1
650 TABLET ORAL EVERY 4 HOURS PRN
Status: DISCONTINUED | OUTPATIENT
Start: 2022-11-22 | End: 2022-11-24 | Stop reason: HOSPADM

## 2022-11-22 RX ORDER — ROSUVASTATIN CALCIUM 10 MG/1
10 TABLET, COATED ORAL EVERY OTHER DAY
Status: DISCONTINUED | OUTPATIENT
Start: 2022-11-22 | End: 2022-11-22

## 2022-11-22 RX ORDER — FENTANYL CITRATE 50 UG/ML
25 INJECTION, SOLUTION INTRAMUSCULAR; INTRAVENOUS EVERY 5 MIN PRN
Status: DISCONTINUED | OUTPATIENT
Start: 2022-11-22 | End: 2022-11-24 | Stop reason: HOSPADM

## 2022-11-22 RX ORDER — BISACODYL 10 MG
10 SUPPOSITORY, RECTAL RECTAL DAILY PRN
Status: DISCONTINUED | OUTPATIENT
Start: 2022-11-22 | End: 2022-11-24 | Stop reason: HOSPADM

## 2022-11-22 RX ORDER — SODIUM,POTASSIUM PHOSPHATES 280-250MG
2 POWDER IN PACKET (EA) ORAL
Status: DISCONTINUED | OUTPATIENT
Start: 2022-11-22 | End: 2022-11-24 | Stop reason: HOSPADM

## 2022-11-22 RX ORDER — MAG HYDROX/ALUMINUM HYD/SIMETH 200-200-20
30 SUSPENSION, ORAL (FINAL DOSE FORM) ORAL 4 TIMES DAILY PRN
Status: DISCONTINUED | OUTPATIENT
Start: 2022-11-22 | End: 2022-11-24 | Stop reason: HOSPADM

## 2022-11-22 RX ORDER — SODIUM CHLORIDE 0.9 % (FLUSH) 0.9 %
10 SYRINGE (ML) INJECTION
Status: DISCONTINUED | OUTPATIENT
Start: 2022-11-22 | End: 2022-11-24 | Stop reason: HOSPADM

## 2022-11-22 RX ORDER — SIMETHICONE 80 MG
1 TABLET,CHEWABLE ORAL 4 TIMES DAILY PRN
Status: DISCONTINUED | OUTPATIENT
Start: 2022-11-22 | End: 2022-11-24 | Stop reason: HOSPADM

## 2022-11-22 RX ORDER — LIDOCAINE HYDROCHLORIDE 20 MG/ML
INJECTION INTRAVENOUS
Status: DISCONTINUED | OUTPATIENT
Start: 2022-11-22 | End: 2022-11-22

## 2022-11-22 RX ORDER — SODIUM CHLORIDE 0.9 % (FLUSH) 0.9 %
10 SYRINGE (ML) INJECTION EVERY 12 HOURS PRN
Status: DISCONTINUED | OUTPATIENT
Start: 2022-11-22 | End: 2022-11-24 | Stop reason: HOSPADM

## 2022-11-22 RX ORDER — IPRATROPIUM BROMIDE AND ALBUTEROL SULFATE 2.5; .5 MG/3ML; MG/3ML
3 SOLUTION RESPIRATORY (INHALATION) EVERY 4 HOURS PRN
Status: DISCONTINUED | OUTPATIENT
Start: 2022-11-22 | End: 2022-11-24 | Stop reason: HOSPADM

## 2022-11-22 RX ORDER — MIDAZOLAM HYDROCHLORIDE 1 MG/ML
INJECTION, SOLUTION INTRAMUSCULAR; INTRAVENOUS
Status: DISCONTINUED | OUTPATIENT
Start: 2022-11-22 | End: 2022-11-22

## 2022-11-22 RX ORDER — ONDANSETRON 2 MG/ML
4 INJECTION INTRAMUSCULAR; INTRAVENOUS EVERY 8 HOURS PRN
Status: DISCONTINUED | OUTPATIENT
Start: 2022-11-22 | End: 2022-11-24 | Stop reason: HOSPADM

## 2022-11-22 RX ORDER — TALC
6 POWDER (GRAM) TOPICAL NIGHTLY PRN
Status: DISCONTINUED | OUTPATIENT
Start: 2022-11-22 | End: 2022-11-24 | Stop reason: HOSPADM

## 2022-11-22 RX ORDER — ACETAMINOPHEN 10 MG/ML
1000 INJECTION, SOLUTION INTRAVENOUS ONCE
Status: COMPLETED | OUTPATIENT
Start: 2022-11-22 | End: 2022-11-22

## 2022-11-22 RX ORDER — ATORVASTATIN CALCIUM 40 MG/1
40 TABLET, FILM COATED ORAL EVERY OTHER DAY
Status: DISCONTINUED | OUTPATIENT
Start: 2022-11-22 | End: 2022-11-24 | Stop reason: HOSPADM

## 2022-11-22 RX ORDER — TAMSULOSIN HYDROCHLORIDE 0.4 MG/1
0.4 CAPSULE ORAL DAILY
Status: DISCONTINUED | OUTPATIENT
Start: 2022-11-22 | End: 2022-11-24 | Stop reason: HOSPADM

## 2022-11-22 RX ORDER — IBUPROFEN 200 MG
24 TABLET ORAL
Status: DISCONTINUED | OUTPATIENT
Start: 2022-11-22 | End: 2022-11-24 | Stop reason: HOSPADM

## 2022-11-22 RX ORDER — HEPARIN SODIUM 5000 [USP'U]/ML
5000 INJECTION, SOLUTION INTRAVENOUS; SUBCUTANEOUS EVERY 8 HOURS
Status: DISCONTINUED | OUTPATIENT
Start: 2022-11-22 | End: 2022-11-22

## 2022-11-22 RX ORDER — HYDROCODONE BITARTRATE AND ACETAMINOPHEN 5; 325 MG/1; MG/1
1 TABLET ORAL EVERY 6 HOURS PRN
Status: DISCONTINUED | OUTPATIENT
Start: 2022-11-22 | End: 2022-11-24 | Stop reason: HOSPADM

## 2022-11-22 RX ORDER — SODIUM CHLORIDE 9 MG/ML
INJECTION, SOLUTION INTRAVENOUS CONTINUOUS
Status: ACTIVE | OUTPATIENT
Start: 2022-11-22 | End: 2022-11-22

## 2022-11-22 RX ADMIN — MIDAZOLAM HYDROCHLORIDE 2 MG: 1 INJECTION, SOLUTION INTRAMUSCULAR; INTRAVENOUS at 11:11

## 2022-11-22 RX ADMIN — HEPARIN SODIUM 5000 UNITS: 5000 INJECTION INTRAVENOUS; SUBCUTANEOUS at 02:11

## 2022-11-22 RX ADMIN — OXYBUTYNIN CHLORIDE 5 MG: 5 TABLET ORAL at 09:11

## 2022-11-22 RX ADMIN — SODIUM CHLORIDE: 0.9 INJECTION, SOLUTION INTRAVENOUS at 02:11

## 2022-11-22 RX ADMIN — TAMSULOSIN HYDROCHLORIDE 0.4 MG: 0.4 CAPSULE ORAL at 08:11

## 2022-11-22 RX ADMIN — Medication 6 MG: at 09:11

## 2022-11-22 RX ADMIN — PROPOFOL 10 MG: 10 INJECTION, EMULSION INTRAVENOUS at 12:11

## 2022-11-22 RX ADMIN — ACETAMINOPHEN 1000 MG: 10 INJECTION INTRAVENOUS at 12:11

## 2022-11-22 RX ADMIN — ATORVASTATIN CALCIUM 40 MG: 40 TABLET, FILM COATED ORAL at 08:11

## 2022-11-22 RX ADMIN — HEPARIN SODIUM 5000 UNITS: 5000 INJECTION INTRAVENOUS; SUBCUTANEOUS at 09:11

## 2022-11-22 RX ADMIN — SODIUM CHLORIDE: 0.9 INJECTION, SOLUTION INTRAVENOUS at 05:11

## 2022-11-22 RX ADMIN — SODIUM CHLORIDE 1000 ML: 0.9 INJECTION, SOLUTION INTRAVENOUS at 12:11

## 2022-11-22 RX ADMIN — FENTANYL CITRATE 50 MCG: 50 INJECTION, SOLUTION INTRAMUSCULAR; INTRAVENOUS at 12:11

## 2022-11-22 RX ADMIN — CEFTRIAXONE 2 G: 2 INJECTION, POWDER, FOR SOLUTION INTRAMUSCULAR; INTRAVENOUS at 12:11

## 2022-11-22 RX ADMIN — LIDOCAINE HYDROCHLORIDE 100 MG: 20 INJECTION, SOLUTION INTRAVENOUS at 12:11

## 2022-11-22 RX ADMIN — FENTANYL CITRATE 50 MCG: 50 INJECTION, SOLUTION INTRAMUSCULAR; INTRAVENOUS at 11:11

## 2022-11-22 RX ADMIN — PROPOFOL 100 MG: 10 INJECTION, EMULSION INTRAVENOUS at 12:11

## 2022-11-22 RX ADMIN — OXYBUTYNIN CHLORIDE 5 MG: 5 TABLET ORAL at 02:11

## 2022-11-22 RX ADMIN — SODIUM CHLORIDE, SODIUM LACTATE, POTASSIUM CHLORIDE, AND CALCIUM CHLORIDE: .6; .31; .03; .02 INJECTION, SOLUTION INTRAVENOUS at 11:11

## 2022-11-22 NOTE — PROVIDER TRANSFER
Outside Transfer Acceptance Note / Regional Referral Center    Referring facility: [facility]   Referring provider: CATALINA DUEÑAS  Accepting facility: [facility]   Accepting provider: VERO CALLOWAY  Admitting provider: YAMIL CONROY  Reason for transfer:  UROLOGY CONSULT   Transfer diagnosis: PYELONEPHRITIS   Transfer specialty requested: Urology  Transfer specialty notified: yes  Transfer level: NUMBER 1-5: 2  Bed type requested: TELEMETRY   Isolation status: No active isolations   Admission class or status: IP- Inpatient      Narrative     The patient is a 80 y/o with PMH of BPM who presented with right flank and groin pain. He had a freire placed in mid August. He lives in [place] but has a place in [place] where he comes for the winter. A week ago he had lithotripsy done on the R and it appears that there may be some fragments that have remained now causing some obstruction leading to R hydronephrosis. Partially obstructing adjacent distal right ureteral calculi noted on CT. Cholelithiasis noted but no evidence of cholecystitis noted on US. WBC 16k, creatinine 1.5 but no previous baseline for comparison. UA positive for UTI. He was given IVF and ceftriaxone. Urology aware at the accepting facility. Patient will continue to get IVF as BPs are in the high 90s - 110s. Patient not requesting anything for pain control.        Objective     Vitals: Temp: 98.7 °F (37.1 °C) (11/21/22 1930)  Pulse: 64 (11/21/22 2303)  Resp: 14 (11/21/22 2303)  BP: (!) 97/44 (11/21/22 2303)  SpO2: 96 % (11/21/22 2303)  Recent Labs: All pertinent labs within the past 24 hours have been reviewed.  Recent imaging: see CT and US    Airway:     Vent settings:     IV access:        Peripheral IV - Single Lumen 11/21/22 1525 20 G Right Antecubital (Active)     Infusions:   Allergies: Review of patient's allergies indicates:  No Known Allergies   NPO: No    Anticoagulation:   Anticoagulants       None             Instructions       [facility]  Admit to Hospital Medicine  Upon patient arrival to floor, please contact Hospital Medicine on call.

## 2022-11-22 NOTE — HPI
This is a 77 year old male with a PMHx of obstructive uropathy (with indwelling freire), HLD and nephrolithiasais s/p lithotripsy (11/15) who presents with right flank pain.     Patient reports developing right flank pain associated with nausea, vomiting/hematemesis that occurred on the day of presentation. He has been having intermittent similar episodes of flank pain. He tried taking Ibuprofen which helped with his symptoms. Associated symptoms include fevers, chills and vomiting. No other reported symptoms. In the ED, the patient was febrile (39.4), later became hypotensive (97/44). Labs showed leukocytosis (16.2), an elevated creatinine (1.5 - baseline of 1.0). UA showed +1 leukocytes, 80 WBCs, 20 RBCs, positive nitrites. CXR showed no acute process. US abdomen showed cholelithiasis without cholecystitis. CT A/P showed moderate right-sided hydronephrosis and hydroureter secondary to partially obstructing adjacent distal right ureteral calculi & small nonobstructing right renal calculi with mild right perinephric and periureteric inflammatory change may represent underlying UTI/pyelonephritis. He was given fluids, ceftriaxone and protonix, zofran and tylenol. The patient was admitted for further management.

## 2022-11-22 NOTE — PLAN OF CARE
[place] [facility]  Initial Discharge Assessment    SW completed assessment at bedside. Patient stated that he and his wife are from [place] but live in [place] for the winter. Patient stated that he was supposed to go home Friday to prepare for surgery on [place] but his recent hospitalization changed things. SW inquired about PCP when staying in [place]. Patient stated that he goes to [facility]. Patient stated that they will be here until February.     Primary Care Provider: Primary Doctor No    Admission Diagnosis: Pyelonephritis [N12]    Admission Date: 11/22/2022  Expected Discharge Date: Pending    Discharge Barriers Identified: None    Payor: GENERIC MEDICARE ADVANTAGE / Plan: MEDICARE ADVANTAGE PPO GENERIC / Product Type: Medicare Advantage /     Extended Emergency Contact Information  Primary Emergency Contact: RAJEEV VILLALOBOS  Address: [address] [place] [address] [facility]  Home Phone: [phone]  Mobile Phone: [phone]  Relation: Spouse  Preferred language: English   needed? No    Discharge Plan A: Home  Discharge Plan B: Home    No Pharmacies Listed    Initial Assessment (most recent)       Adult Discharge Assessment - 11/22/22 1232          Discharge Assessment    Assessment Type Discharge Planning Assessment     Confirmed/corrected address, phone number and insurance Yes     Confirmed Demographics Correct on Facesheet     Source of Information patient     Communicated LINK with patient/caregiver Date not available/Unable to determine     Reason For Admission Pyelonephritis     Lives With spouse     Do you expect to return to your current living situation? Yes     Do you have help at home or someone to help you manage your care at home? Yes     Who are your caregiver(s) and their phone number(s)? RAJEEV VILLALOBOS (Spouse)   [phone] (Mobile)     Prior to hospitilization cognitive status: Alert/Oriented     Current cognitive status:  Alert/Oriented     Walking or Climbing Stairs Difficulty none     Dressing/Bathing Difficulty none     Equipment Currently Used at Home none     Readmission within 30 days? No     Patient currently being followed by outpatient case management? No     Do you currently have service(s) that help you manage your care at home? No     Do you take prescription medications? Yes     Do you have prescription coverage? Yes     Coverage Advantage Medicare     Do you have any problems affording any of your prescribed medications? No     Is the patient taking medications as prescribed? yes     Who is going to help you get home at discharge? RAJEEV VILLALOBOS (Spouse)   [phone] (Mobile)     How do you get to doctors appointments? car, drives self     Are you on dialysis? No     Do you take coumadin? No     Discharge Plan A Home     Discharge Plan B Home     DME Needed Upon Discharge  none     Discharge Plan discussed with: Patient     Discharge Barriers Identified None        Relationship/Environment    Name(s) of Who Lives With Patient RAJEEV VILLALOBOS (Spouse)   [phone] (Mobile)

## 2022-11-22 NOTE — PROGRESS NOTES
Hospital Medicine Progress NOTE      Admit Date: 2022  LOS: 0  Code Status: Full Code   Date of Consult: 2022  : 1945  Age: 77 y.o.  Weight:   Wt Readings from Last 1 Encounters:   22 86.2 kg (190 lb)     Sex: male  Bed: [facility] A:   MRN: [id number]  Attending Physician: Richelle Whitlock MD  Primary Service: Networked reference to record PCT         Reviewed care of overnight H&P            Tests          Latest Reference Range & Units 22 15:28 22 06:13   WBC 3.90 - 12.70 K/uL 16.20 (H) 11.87   RBC 4.60 - 6.20 M/uL 5.25 4.61   Hemoglobin 14.0 - 18.0 g/dL 14.9 12.8 (L)      Latest Reference Range & Units 22 15:28 22 06:13   Creatinine 0.5 - 1.4 mg/dL 1.5 (H) 1.9 (H)     Urine culture pending    CT abd/p 22 Impression:     1. Small hiatal hernia.  2. Cholelithiasis.  Further evaluation with ultrasound as deemed clinically necessary.  3. Previous left adrenalectomy.  4. Moderate right-sided hydronephrosis and hydroureter secondary to partially obstructing adjacent distal right ureteral calculi.  5. Small nonobstructing right renal calculi.  6.  Mild right perinephric and periureteric inflammatory change may represent underlying UTI/pyelonephritis.  Correlate clinically with UA.  7. Diverticulosis.  8. Significant prostatic hypertrophy.  Further evaluation as deemed clinically necessary.  9. Burgos catheter.    A/P:   Pyelonephritis secondary to obstructive uropathy   Plan on Stent placement today by urology consult  Continue Rocephin while awaiting speciation       Richelle Whitlock MD  Department of Hospital Medicine   [facility]

## 2022-11-22 NOTE — SUBJECTIVE & OBJECTIVE
Past Medical History:   Diagnosis Date    HLD (hyperlipidemia)     Obstructive uropathy        No past surgical history on file.    Review of patient's allergies indicates:  No Known Allergies    Family History    None         Tobacco Use    Smoking status: Never    Smokeless tobacco: Never   Substance and Sexual Activity    Alcohol use: Not Currently    Drug use: Never    Sexual activity: Not on file       Review of Systems   Constitutional:  Positive for chills, diaphoresis, fatigue and fever.   HENT: Negative.     Eyes: Negative.    Respiratory:  Negative for cough, chest tightness and shortness of breath.    Cardiovascular:  Negative for chest pain.   Gastrointestinal: Negative.  Negative for constipation, diarrhea and nausea.   Genitourinary:  Positive for difficulty urinating and flank pain.   Neurological: Negative.    Psychiatric/Behavioral: Negative.       Objective:     Temp:  [97.8 °F (36.6 °C)-103 °F (39.4 °C)] 99.9 °F (37.7 °C)  Pulse:  [64-86] 68  Resp:  [14-22] 18  SpO2:  [91 %-99 %] 94 %  BP: ()/(44-76) 110/53     Body mass index is 26.5 kg/m².           Drains       Drain  Duration                  Urethral Catheter 08/17/22 1120 Double-lumen 96 days                    Physical Exam  Vitals and nursing note reviewed.   Constitutional:       Appearance: He is well-developed.   HENT:      Head: Normocephalic.   Eyes:      Conjunctiva/sclera: Conjunctivae normal.   Neck:      Thyroid: No thyromegaly.      Trachea: No tracheal deviation.   Cardiovascular:      Rate and Rhythm: Normal rate.      Heart sounds: Normal heart sounds.   Pulmonary:      Effort: Pulmonary effort is normal. No respiratory distress.      Breath sounds: Normal breath sounds. No wheezing.   Abdominal:      General: Bowel sounds are normal.      Palpations: Abdomen is soft.      Tenderness: There is no abdominal tenderness. There is right CVA tenderness. There is no rebound.      Hernia: No hernia is present.    Genitourinary:     Comments: Burgos in place with yellow urine  Musculoskeletal:         General: No tenderness. Normal range of motion.      Cervical back: Normal range of motion and neck supple.   Lymphadenopathy:      Cervical: No cervical adenopathy.   Skin:     General: Skin is warm and dry.      Findings: No erythema or rash.   Neurological:      Mental Status: He is alert and oriented to person, place, and time.   Psychiatric:         Behavior: Behavior normal.         Thought Content: Thought content normal.         Judgment: Judgment normal.       Significant Labs:    BMP:  Recent Labs   Lab 11/21/22  1528 11/22/22  0613    140   K 4.4 4.1    110   CO2 23 21*   BUN 21 23   CREATININE 1.5* 1.9*   CALCIUM 9.5 8.2*       CBC:  Recent Labs   Lab 11/21/22  1528 11/22/22  0613   WBC 16.20* 11.87   HGB 14.9 12.8*   HCT 45.7 40.2    203       Blood Culture: No results for input(s): LABBLOO in the last 168 hours.  Urine Culture: No results for input(s): LABURIN in the last 168 hours.    Significant Imaging:  CT: I have reviewed all results within the past 24 hours and my personal findings are:  Right hydronephrosis secondary to steinstrasse in the  mid/distal right ureter.

## 2022-11-22 NOTE — PLAN OF CARE
Problem: Adult Inpatient Plan of Care  Goal: Plan of Care Review  Outcome: Ongoing, Progressing  Goal: Patient-Specific Goal (Individualized)  Outcome: Ongoing, Progressing  Goal: Absence of Hospital-Acquired Illness or Injury  Outcome: Ongoing, Progressing  Goal: Optimal Comfort and Wellbeing  Outcome: Ongoing, Progressing  Goal: Readiness for Transition of Care  Outcome: Ongoing, Progressing     Problem: Renal Function Impairment (Acute Kidney Injury/Impairment)  Goal: Effective Renal Function  Outcome: Ongoing, Progressing     Problem: Fall Injury Risk  Goal: Absence of Fall and Fall-Related Injury  Outcome: Ongoing, Progressing     Problem: Infection  Goal: Absence of Infection Signs and Symptoms  Outcome: Ongoing, Progressing

## 2022-11-22 NOTE — ED NOTES
Drainage bag connected to catheter and is hanging at bedside.  Patient states he has been connecting the bag only at night.

## 2022-11-22 NOTE — ANESTHESIA POSTPROCEDURE EVALUATION
Anesthesia Post Evaluation    Patient: William Cruz    Procedure(s) Performed: Procedure(s) (LRB):  CYSTOSCOPY, WITH URETERAL STENT INSERTION (Right)    Final Anesthesia Type: MAC      Patient location during evaluation: PACU  Patient participation: Yes- Able to Participate  Level of consciousness: awake and alert  Post-procedure vital signs: reviewed and stable  Pain management: adequate  Airway patency: patent  KIRILL mitigation strategies: Multimodal analgesia  PONV status at discharge: No PONV  Anesthetic complications: no      Cardiovascular status: blood pressure returned to baseline  Respiratory status: unassisted and spontaneous ventilation  Hydration status: euvolemic  Follow-up not needed.          Vitals Value Taken Time   /51 11/22/22 1331   Temp 36.9 °C (98.5 °F) 11/22/22 1331   Pulse 57 11/22/22 1331   Resp 18 11/22/22 1331   SpO2 93 % 11/22/22 1331         Event Time   Out of Recovery 13:16:00         Pain/Hussain Score: Pain Rating Prior to Med Admin: 0 (11/22/2022 12:48 PM)  Pain Rating Post Med Admin: 0 (11/21/2022  7:42 PM)  Hussain Score: 9 (11/22/2022  1:00 PM)

## 2022-11-22 NOTE — ED NOTES
Patient's wife called ED and advised that patient has not been accepted to another hospital at this time.

## 2022-11-22 NOTE — ASSESSMENT & PLAN NOTE
Patient with acute kidney injury likely due to pre-renal azotemia SOL is currently stable. Labs reviewed- Renal function/electrolytes with CrCl cannot be calculated (Unknown ideal weight.). according to latest data. Monitor urine output and serial BMP and adjust therapy as needed. Avoid nephrotoxins and renally dose meds for GFR listed above.     IVF  Monitor BMPs

## 2022-11-22 NOTE — H&P
[facility] Medicine  History & Physical    Patient Name: William Cruz  MRN: [id number]  Patient Class: IP- Inpatient  Admission Date: 11/22/2022  Attending Physician: Fidel Whitfield MD   Primary Care Provider: Primary Doctor No         Patient information was obtained from patient and ER records.     Subjective:     Principal Problem:Pyelonephritis    Chief Complaint: No chief complaint on file.       HPI: This is a 77 year old male with a PMHx of obstructive uropathy (with indwelling freire), HLD and nephrolithiasais s/p lithotripsy (11/15) who presents with right flank pain.     Patient reports developing right flank pain associated with nausea, vomiting/hematemesis that occurred on the day of presentation. He has been having intermittent similar episodes of flank pain. He tried taking Ibuprofen which helped with his symptoms. Associated symptoms include fevers, chills and vomiting. No other reported symptoms. In the ED, the patient was febrile (39.4), later became hypotensive (97/44). Labs showed leukocytosis (16.2), an elevated creatinine (1.5 - baseline of 1.0). UA showed +1 leukocytes, 80 WBCs, 20 RBCs, positive nitrites. CXR showed no acute process. US abdomen showed cholelithiasis without cholecystitis. CT A/P showed moderate right-sided hydronephrosis and hydroureter secondary to partially obstructing adjacent distal right ureteral calculi & small nonobstructing right renal calculi with mild right perinephric and periureteric inflammatory change may represent underlying UTI/pyelonephritis. He was given fluids, ceftriaxone and protonix, zofran and tylenol. The patient was admitted for further management.      No past medical history on file.    No past surgical history on file.    Review of patient's allergies indicates:  No Known Allergies    Current Facility-Administered Medications on File Prior to Encounter   Medication    [COMPLETED] 0.9%  NaCl infusion    [COMPLETED] acetaminophen  tablet 1,000 mg    [COMPLETED] cefTRIAXone (ROCEPHIN) 1 g/50 mL D5W IVPB    [COMPLETED] ondansetron injection 4 mg    [COMPLETED] pantoprazole injection 40 mg    [COMPLETED] sodium chloride 0.9% bolus 1,000 mL    [COMPLETED] sodium chloride 0.9% bolus 1,000 mL     Current Outpatient Medications on File Prior to Encounter   Medication Sig    aspirin (ECOTRIN) 81 MG EC tablet Take 81 mg by mouth once daily.    magnesium 200 mg Tab Take 100 mg by mouth every morning.    rosuvastatin calcium (CRESTOR ORAL) Take by mouth every other day.    tamsulosin (FLOMAX) 0.4 mg Cap Take by mouth once daily.     Family History    None       Tobacco Use    Smoking status: Never    Smokeless tobacco: Never   Substance and Sexual Activity    Alcohol use: Not Currently    Drug use: Never    Sexual activity: Not on file     Review of Systems   Constitutional:  Positive for fatigue and fever.   HENT: Negative.     Eyes: Negative.    Respiratory: Negative.     Cardiovascular: Negative.    Gastrointestinal:  Positive for abdominal pain, nausea and vomiting.   Endocrine: Negative.    Genitourinary: Negative.    Musculoskeletal: Negative.    Skin: Negative.    Allergic/Immunologic: Negative.    Neurological: Negative.    Psychiatric/Behavioral: Negative.     Objective:     Vital Signs (Most Recent):  Pulse: 67 (11/22/22 0427)  Resp: (!) 22 (11/22/22 0427)  BP: 131/60 (11/22/22 0427)  SpO2: (!) 94 % (11/22/22 0427)   Vital Signs (24h Range):  Temp:  [97.8 °F (36.6 °C)-103 °F (39.4 °C)] 98.7 °F (37.1 °C)  Pulse:  [64-86] 67  Resp:  [14-22] 22  SpO2:  [94 %-99 %] 94 %  BP: ()/(44-76) 131/60        There is no height or weight on file to calculate BMI.    Physical Exam  Vitals and nursing note reviewed.   Constitutional:       General: He is not in acute distress.     Appearance: Normal appearance. He is not ill-appearing.   HENT:      Head: Normocephalic and atraumatic.      Nose: Nose normal.      Mouth/Throat:      Mouth:  Mucous membranes are moist.   Eyes:      Extraocular Movements: Extraocular movements intact.   Cardiovascular:      Rate and Rhythm: Normal rate.      Pulses: Normal pulses.      Heart sounds: No murmur heard.  Pulmonary:      Effort: Pulmonary effort is normal. No respiratory distress.   Abdominal:      General: Abdomen is flat.      Palpations: Abdomen is soft.      Tenderness: There is abdominal tenderness.   Musculoskeletal:      Right lower leg: No edema.      Left lower leg: No edema.   Skin:     General: Skin is warm.      Capillary Refill: Capillary refill takes less than 2 seconds.   Neurological:      General: No focal deficit present.      Mental Status: He is alert.   Psychiatric:         Mood and Affect: Mood normal.           Significant Labs: All pertinent labs within the past 24 hours have been reviewed.    Significant Imaging: I have reviewed all pertinent imaging results/findings within the past 24 hours.    Assessment/Plan:     * Pyelonephritis  Presented with flank pain   Labs showed leukocytosis (16.2). UA showed +1 leukocytes, 80 WBCs, 20 RBCs, positive nitrites.   CT A/P showed moderate right-sided hydronephrosis and hydroureter secondary to partially obstructing adjacent distal right ureteral calculi & small nonobstructing right renal calculi with mild right perinephric and periureteric inflammatory change may represent underlying UTI/pyelonephritis.   Admitted for further management       Plan:   - Ceftriaxone   - IVF  - F/U urine culture     SOL (acute kidney injury)  Patient with acute kidney injury likely due to pre-renal azotemia SOL is currently stable. Labs reviewed- Renal function/electrolytes with CrCl cannot be calculated (Unknown ideal weight.). according to latest data. Monitor urine output and serial BMP and adjust therapy as needed. Avoid nephrotoxins and renally dose meds for GFR listed above.     IVF  Monitor BMPs     Nephrolithiasis  Presented with flank pain   CT A/P showed  moderate right-sided hydronephrosis and hydroureter secondary to partially obstructing adjacent distal right ureteral calculi & small nonobstructing right renal calculi with mild right perinephric and periureteric inflammatory change may represent underlying UTI/pyelonephritis.   Admitted for further management     Plan:   - Urology consult   - Pain control   - IVF     Hyperlipidemia  Resume statin       Obstructive uropathy  W/ chronic indwelling freire   Maintain freire catheter   Urology consult   Resume home Flomax     VTE Risk Mitigation (From admission, onward)         Ordered     heparin (porcine) injection 5,000 Units  Every 8 hours         11/22/22 0459     IP VTE HIGH RISK PATIENT  Once         11/22/22 0459     Place sequential compression device  Until discontinued         11/22/22 0459                   Fidel Whitfield MD  Department of Hospital Medicine   [facility] - Med Surg

## 2022-11-22 NOTE — TRANSFER OF CARE
"Anesthesia Transfer of Care Note    Patient: William Cruz    Procedure(s) Performed: Procedure(s) (LRB):  CYSTOSCOPY, WITH URETERAL STENT INSERTION (Right)    Patient location: PACU    Anesthesia Type: general    Transport from OR: Transported from OR on room air with adequate spontaneous ventilation    Post pain: adequate analgesia    Post assessment: no apparent anesthetic complications and tolerated procedure well    Post vital signs: stable    Level of consciousness: alert, oriented and awake    Nausea/Vomiting: no nausea/vomiting    Complications: none    Transfer of care protocol was followed      Last vitals:   Visit Vitals  BP (!) 120/53 (BP Location: Right arm, Patient Position: Lying)   Pulse 63   Temp 36.5 °C (97.7 °F) (Skin)   Resp 17   Ht 5' 11" (1.803 m)   Wt 86.2 kg (190 lb)   SpO2 95%   BMI 26.50 kg/m²     "

## 2022-11-22 NOTE — ASSESSMENT & PLAN NOTE
Presented with flank pain   Labs showed leukocytosis (16.2). UA showed +1 leukocytes, 80 WBCs, 20 RBCs, positive nitrites.   CT A/P showed moderate right-sided hydronephrosis and hydroureter secondary to partially obstructing adjacent distal right ureteral calculi & small nonobstructing right renal calculi with mild right perinephric and periureteric inflammatory change may represent underlying UTI/pyelonephritis.   Admitted for further management       Plan:   - Ceftriaxone   - IVF  - F/U urine culture

## 2022-11-22 NOTE — CONSULTS
[facility] Surg  Urology  Consult Note    Patient Name: William Cruz  MRN: [id number]  Admission Date: 11/22/2022  Hospital Length of Stay: 0   Code Status: Full Code   Attending Provider: Richelle Whitlock MD   Consulting Provider: KIANA Schmitz MD  Primary Care Physician: Primary Doctor No  Principal Problem:Pyelonephritis    Inpatient consult to Urology  Consult performed by: KETAN Schmitz MD  Consult ordered by: Fidel Whitfield MD          Subjective:     HPI:  Urolithiasis  Patient complains of right flank pain with radiation to the groin. Onset of symptoms was abrupt starting 1 day ago with unchanged course since that time. Patient describes the pain as aching and colicky, intermittent and rated as moderate. The patient has had nausea and diaphoresis. There has been fever and chills. The patient is not complaining of dysuria or frequency. Risk factors for urolithiasis: history of stone disease.   He had Right ESWL in [place], [place] last week.    Benign Prostatic Hypertrophy  Patient complains of lower urinary tract symptoms. He reports incomplete emptying, intermittency, and weak stream. He denies hematuria. Patient states symptoms are of severe severity. Onset of symptoms was a few months ago and was gradual in onset.  He has no personal history of prostate cancer. He reports a history of kidney stones. He denies flank pain, gross hematuria, and recurrent UTI.  He takes Flomax.  He went into urinary retention a few months ago.  He has been managed with a Burgos.  He tells me he is dur for UroLift next week in [place], [place].          Past Medical History:   Diagnosis Date    HLD (hyperlipidemia)     Obstructive uropathy        No past surgical history on file.    Review of patient's allergies indicates:  No Known Allergies    Family History    None         Tobacco Use    Smoking status: Never    Smokeless tobacco: Never   Substance and Sexual Activity    Alcohol use: Not Currently     Drug use: Never    Sexual activity: Not on file       Review of Systems   Constitutional:  Positive for chills, diaphoresis, fatigue and fever.   HENT: Negative.     Eyes: Negative.    Respiratory:  Negative for cough, chest tightness and shortness of breath.    Cardiovascular:  Negative for chest pain.   Gastrointestinal: Negative.  Negative for constipation, diarrhea and nausea.   Genitourinary:  Positive for difficulty urinating and flank pain.   Neurological: Negative.    Psychiatric/Behavioral: Negative.       Objective:     Temp:  [97.8 °F (36.6 °C)-103 °F (39.4 °C)] 99.9 °F (37.7 °C)  Pulse:  [64-86] 68  Resp:  [14-22] 18  SpO2:  [91 %-99 %] 94 %  BP: ()/(44-76) 110/53     Body mass index is 26.5 kg/m².           Drains       Drain  Duration                  Urethral Catheter 08/17/22 1120 Double-lumen 96 days                    Physical Exam  Vitals and nursing note reviewed.   Constitutional:       Appearance: He is well-developed.   HENT:      Head: Normocephalic.   Eyes:      Conjunctiva/sclera: Conjunctivae normal.   Neck:      Thyroid: No thyromegaly.      Trachea: No tracheal deviation.   Cardiovascular:      Rate and Rhythm: Normal rate.      Heart sounds: Normal heart sounds.   Pulmonary:      Effort: Pulmonary effort is normal. No respiratory distress.      Breath sounds: Normal breath sounds. No wheezing.   Abdominal:      General: Bowel sounds are normal.      Palpations: Abdomen is soft.      Tenderness: There is no abdominal tenderness. There is right CVA tenderness. There is no rebound.      Hernia: No hernia is present.   Genitourinary:     Comments: Burgos in place with yellow urine  Musculoskeletal:         General: No tenderness. Normal range of motion.      Cervical back: Normal range of motion and neck supple.   Lymphadenopathy:      Cervical: No cervical adenopathy.   Skin:     General: Skin is warm and dry.      Findings: No erythema or rash.   Neurological:      Mental  Status: He is alert and oriented to person, place, and time.   Psychiatric:         Behavior: Behavior normal.         Thought Content: Thought content normal.         Judgment: Judgment normal.       Significant Labs:    BMP:  Recent Labs   Lab 11/21/22  1528 11/22/22  0613    140   K 4.4 4.1    110   CO2 23 21*   BUN 21 23   CREATININE 1.5* 1.9*   CALCIUM 9.5 8.2*       CBC:  Recent Labs   Lab 11/21/22  1528 11/22/22  0613   WBC 16.20* 11.87   HGB 14.9 12.8*   HCT 45.7 40.2    203       Blood Culture: No results for input(s): LABBLOO in the last 168 hours.  Urine Culture: No results for input(s): LABURIN in the last 168 hours.    Significant Imaging:  CT: I have reviewed all results within the past 24 hours and my personal findings are:  Right hydronephrosis secondary to steinstrasse in the  mid/distal right ureter.                      Assessment and Plan:     * Pyelonephritis  Follow culture, treat x 2 weeks    Obstructive uropathy  Keep NPO  Plan on Stent placement today  Continue Rocephin  Consented and Marked for cystoscopy with right stent placement        VTE Risk Mitigation (From admission, onward)         Ordered     heparin (porcine) injection 5,000 Units  Every 8 hours         11/22/22 0515     IP VTE HIGH RISK PATIENT  Once         11/22/22 0459     Place sequential compression device  Until discontinued         11/22/22 0459                Thank you for your consult. I will follow-up with patient. Please contact us if you have any additional questions.    KIANA Schmitz MD  Urology  [facility] Surg

## 2022-11-22 NOTE — OP NOTE
Date of Procedure: 11/22/2022     Preoperative Diagnosis:  Right obstructive pyelonephritis    Postoperative Diagnosis:  Right obstructive pyelonephritis    Primary Surgeon: KETAN Schmitz MD    Anesthesia:  General    Procedure Performed:  Cystoscopy, right ureteral stent placement, fluoroscopy, Burgos catheter placement    Estimated Blood Loss:  Minimal    Drains:  6 Uruguayan x 26 cm double-J stent without string, 16 [place]ayan Burgos catheter    Specimens Removed:  None    Complications:  None    Indications: William Cruz is a 77-year-old male with a history of right-sided kidney stones.  He underwent ESWL last week in [place].  He presented to the emergency department in [place] with right-sided flank pain and fever.  Imaging showed hydronephrosis perinephric stranding and mid ureteral stones.  He was transferred here for an urgent urologic procedure.    Procedure in Detail:    William Cruz was taken to the operating room where he was positively identified by claudio.  He is placed supine the operating room table.  Following induction of adequate general anesthesia he was placed in the dorsal lithotomy position and his external genitalia were prepped and draped in usual sterile fashion.      A preoperative time-out was performed as well as confirmation of preoperative antibiotics and preoperative marking the right side.       film was taking using fluoroscopy.      A 22 U[place]ayan rigid cystoscope was passed per urethra into the bladder under direct vision.  The prostate was seen to be moderately hypertrophied.    The bladder was inspected he had moderate trabeculation noted with the urothelium erythematous consistent with chronic catheter as well as cystitis.      The right ureteral orifice was identified.    I then passed a 5 [place]ayan open-ended catheter through the scope.  I then used this to direct a hydrophilic tip Sensor wire through the open-ended catheter into the right ureter.  The wire was then  passed up under live fluoroscopy to the level of the right kidney.  There was some resistance met at the level of the ureteral stones.  Once the wire was beyond the stones slightly cloudy urine was seen draining from the ureter.  I withdrew the open-ended catheter.      I then passed a 6 Omani x 26 cm double-J stent without string over the wire to plan coil within the right kidney and a coil within the bladder confirmed on fluoroscopy and visually respectively.  Cloudy urine continued to drain from the stent.      I then withdrew the scope.      I then placed a 16 O[place]i Burgos catheter for postoperative drainage.      His anesthesia was reversed was taken to recovery room in stable condition.

## 2022-11-22 NOTE — SUBJECTIVE & OBJECTIVE
No past medical history on file.    No past surgical history on file.    Review of patient's allergies indicates:  No Known Allergies    Current Facility-Administered Medications on File Prior to Encounter   Medication    [COMPLETED] 0.9%  NaCl infusion    [COMPLETED] acetaminophen tablet 1,000 mg    [COMPLETED] cefTRIAXone (ROCEPHIN) 1 g/50 mL D5W IVPB    [COMPLETED] ondansetron injection 4 mg    [COMPLETED] pantoprazole injection 40 mg    [COMPLETED] sodium chloride 0.9% bolus 1,000 mL    [COMPLETED] sodium chloride 0.9% bolus 1,000 mL     Current Outpatient Medications on File Prior to Encounter   Medication Sig    aspirin (ECOTRIN) 81 MG EC tablet Take 81 mg by mouth once daily.    magnesium 200 mg Tab Take 100 mg by mouth every morning.    rosuvastatin calcium (CRESTOR ORAL) Take by mouth every other day.    tamsulosin (FLOMAX) 0.4 mg Cap Take by mouth once daily.     Family History    None       Tobacco Use    Smoking status: Never    Smokeless tobacco: Never   Substance and Sexual Activity    Alcohol use: Not Currently    Drug use: Never    Sexual activity: Not on file     Review of Systems   Constitutional:  Positive for fatigue and fever.   HENT: Negative.     Eyes: Negative.    Respiratory: Negative.     Cardiovascular: Negative.    Gastrointestinal:  Positive for abdominal pain, nausea and vomiting.   Endocrine: Negative.    Genitourinary: Negative.    Musculoskeletal: Negative.    Skin: Negative.    Allergic/Immunologic: Negative.    Neurological: Negative.    Psychiatric/Behavioral: Negative.     Objective:     Vital Signs (Most Recent):  Pulse: 67 (11/22/22 0427)  Resp: (!) 22 (11/22/22 0427)  BP: 131/60 (11/22/22 0427)  SpO2: (!) 94 % (11/22/22 0427)   Vital Signs (24h Range):  Temp:  [97.8 °F (36.6 °C)-103 °F (39.4 °C)] 98.7 °F (37.1 °C)  Pulse:  [64-86] 67  Resp:  [14-22] 22  SpO2:  [94 %-99 %] 94 %  BP: ()/(44-76) 131/60        There is no height or weight on file to calculate  BMI.    Physical Exam  Vitals and nursing note reviewed.   Constitutional:       General: He is not in acute distress.     Appearance: Normal appearance. He is not ill-appearing.   HENT:      Head: Normocephalic and atraumatic.      Nose: Nose normal.      Mouth/Throat:      Mouth: Mucous membranes are moist.   Eyes:      Extraocular Movements: Extraocular movements intact.   Cardiovascular:      Rate and Rhythm: Normal rate.      Pulses: Normal pulses.      Heart sounds: No murmur heard.  Pulmonary:      Effort: Pulmonary effort is normal. No respiratory distress.   Abdominal:      General: Abdomen is flat.      Palpations: Abdomen is soft.      Tenderness: There is abdominal tenderness.   Musculoskeletal:      Right lower leg: No edema.      Left lower leg: No edema.   Skin:     General: Skin is warm.      Capillary Refill: Capillary refill takes less than 2 seconds.   Neurological:      General: No focal deficit present.      Mental Status: He is alert.   Psychiatric:         Mood and Affect: Mood normal.           Significant Labs: All pertinent labs within the past 24 hours have been reviewed.    Significant Imaging: I have reviewed all pertinent imaging results/findings within the past 24 hours.

## 2022-11-22 NOTE — ANESTHESIA PREPROCEDURE EVALUATION
11/22/2022  William Cruz is a 77 y.o., male.  NPO >8  METS >4    Vitals:    11/22/22 0620 11/22/22 0623 11/22/22 0704 11/22/22 1102   BP:   (!) 110/53 (!) 113/57   BP Location:       Patient Position:   Lying Lying   Pulse:   68 63   Resp:   18 18   Temp:   37.7 °C (99.9 °F) 37.4 °C (99.4 °F)   TempSrc:   Oral Oral   SpO2: (!) 91% 95% (!) 94% (!) 94%   Weight:       Height:           Pre-op Assessment    I have reviewed the Patient Summary Reports.     I have reviewed the Nursing Notes. I have reviewed the NPO Status.   I have reviewed the Medications.     Review of Systems  Anesthesia Hx:  No problems with previous Anesthesia  History of prior surgery of interest to airway management or planning: Denies Family Hx of Anesthesia complications.   Denies Personal Hx of Anesthesia complications.   Social:  Non-Smoker, No Alcohol Use    Hematology/Oncology:         -- Cancer in past history: Oncology Comments: Pheo s/p left adrenalectomy ~20 yrs ago     Cardiovascular:   Exercise tolerance: good Denies Hypertension.  Denies Dysrhythmias.   Denies Angina. hyperlipidemia    Pulmonary:  Pulmonary Normal    Renal/:   Chronic Renal Disease, ARF Obstructive uropathy; pyelonephritis    Hepatic/GI:  Hepatic/GI Normal    Neurological:  Neurology Normal    Endocrine:  Endocrine Normal        Physical Exam  General: Well nourished, Cooperative, Alert and Oriented    Airway:  Mallampati: II   Mouth Opening: Normal  TM Distance: 4 - 6 cm  Tongue: Normal  Neck ROM: Normal ROM    Dental:  Intact, Prominent Incisors    Chest/Lungs:  Clear to auscultation, Normal Respiratory Rate    Heart:  Rate: Normal  Rhythm: Regular Rhythm      Wt Readings from Last 3 Encounters:   11/22/22 86.2 kg (190 lb)   11/21/22 86.2 kg (190 lb)   11/22/22 86.2 kg (190 lb)     Temp Readings from Last 3 Encounters:   11/22/22 37.7 °C (99.9 °F) (Oral)    11/21/22 37.1 °C (98.7 °F) (Oral)   08/17/22 36.9 °C (98.5 °F) (Oral)     BP Readings from Last 3 Encounters:   11/22/22 (!) 110/53   11/22/22 (!) 112/45   08/17/22 (!) 164/91     Pulse Readings from Last 3 Encounters:   11/22/22 68   11/22/22 77   08/17/22 87     Lab Results   Component Value Date    WBC 11.87 11/22/2022    HGB 12.8 (L) 11/22/2022    HCT 40.2 11/22/2022    MCV 87 11/22/2022     11/22/2022       CMP  Sodium   Date Value Ref Range Status   11/22/2022 140 136 - 145 mmol/L Final     Potassium   Date Value Ref Range Status   11/22/2022 4.1 3.5 - 5.1 mmol/L Final     Chloride   Date Value Ref Range Status   11/22/2022 110 95 - 110 mmol/L Final     CO2   Date Value Ref Range Status   11/22/2022 21 (L) 23 - 29 mmol/L Final     Glucose   Date Value Ref Range Status   11/22/2022 116 (H) 70 - 110 mg/dL Final     BUN   Date Value Ref Range Status   11/22/2022 23 8 - 23 mg/dL Final     Creatinine   Date Value Ref Range Status   11/22/2022 1.9 (H) 0.5 - 1.4 mg/dL Final     Calcium   Date Value Ref Range Status   11/22/2022 8.2 (L) 8.7 - 10.5 mg/dL Final     Total Protein   Date Value Ref Range Status   11/21/2022 7.1 6.0 - 8.4 g/dL Final     Albumin   Date Value Ref Range Status   11/21/2022 4.4 3.5 - 5.2 g/dL Final     Total Bilirubin   Date Value Ref Range Status   11/21/2022 1.0 0.1 - 1.0 mg/dL Final     Comment:     For infants and newborns, interpretation of results should be based  on gestational age, weight and in agreement with clinical  observations.    Premature Infant recommended reference ranges:  Up to 24 hours.............<8.0 mg/dL  Up to 48 hours............<12.0 mg/dL  3-5 days..................<15.0 mg/dL  6-29 days.................<15.0 mg/dL       Alkaline Phosphatase   Date Value Ref Range Status   11/21/2022 84 55 - 135 U/L Final     AST   Date Value Ref Range Status   11/21/2022 18 10 - 40 U/L Final     ALT   Date Value Ref Range Status   11/21/2022 17 10 - 44 U/L Final     Anion  Gap   Date Value Ref Range Status   11/22/2022 9 8 - 16 mmol/L Final     eGFR   Date Value Ref Range Status   11/22/2022 36 (A) >60 mL/min/1.73 m^2 Final         Anesthesia Plan  Type of Anesthesia, risks & benefits discussed:    Anesthesia Type: Gen ETT, Gen Supraglottic Airway  Intra-op Monitoring Plan: Standard ASA Monitors  Post Op Pain Control Plan: multimodal analgesia and IV/PO Opioids PRN  Induction:  IV  Airway Plan: Video  Informed Consent: Informed consent signed with the Patient and all parties understand the risks and agree with anesthesia plan.  All questions answered.   ASA Score: 2  Anesthesia Plan Notes: Paper consent obtained and placed in office    Ready For Surgery From Anesthesia Perspective.     .

## 2022-11-22 NOTE — ASSESSMENT & PLAN NOTE
Presented with flank pain   CT A/P showed moderate right-sided hydronephrosis and hydroureter secondary to partially obstructing adjacent distal right ureteral calculi & small nonobstructing right renal calculi with mild right perinephric and periureteric inflammatory change may represent underlying UTI/pyelonephritis.   Admitted for further management     Plan:   - Urology consult   - Pain control   - IVF

## 2022-11-22 NOTE — HPI
Urolithiasis  Patient complains of right flank pain with radiation to the groin. Onset of symptoms was abrupt starting 1 day ago with unchanged course since that time. Patient describes the pain as aching and colicky, intermittent and rated as moderate. The patient has had nausea and diaphoresis. There has been fever and chills. The patient is not complaining of dysuria or frequency. Risk factors for urolithiasis: history of stone disease.   He had Right ESWL in [place], [place] last week.    Benign Prostatic Hypertrophy  Patient complains of lower urinary tract symptoms. He reports incomplete emptying, intermittency, and weak stream. He denies hematuria. Patient states symptoms are of severe severity. Onset of symptoms was a few months ago and was gradual in onset.  He has no personal history of prostate cancer. He reports a history of kidney stones. He denies flank pain, gross hematuria, and recurrent UTI.  He takes Flomax.  He went into urinary retention a few months ago.  He has been managed with a [facility].  He tells me he is dur for Uro[facility] next week in [place], [place].

## 2022-11-22 NOTE — ASSESSMENT & PLAN NOTE
Keep NPO  Plan on Stent placement today  Continue Rocephin  Consented and Marked for cystoscopy with right stent placement

## 2022-11-23 LAB
BACTERIA UR CULT: ABNORMAL
BACTERIA UR CULT: ABNORMAL

## 2022-11-23 PROCEDURE — 11000001 HC ACUTE MED/SURG PRIVATE ROOM

## 2022-11-23 PROCEDURE — 25000003 PHARM REV CODE 250: Performed by: STUDENT IN AN ORGANIZED HEALTH CARE EDUCATION/TRAINING PROGRAM

## 2022-11-23 PROCEDURE — 25000003 PHARM REV CODE 250: Performed by: UROLOGY

## 2022-11-23 PROCEDURE — 63600175 PHARM REV CODE 636 W HCPCS: Performed by: UROLOGY

## 2022-11-23 PROCEDURE — 99900035 HC TECH TIME PER 15 MIN (STAT)

## 2022-11-23 PROCEDURE — 25000003 PHARM REV CODE 250: Performed by: INTERNAL MEDICINE

## 2022-11-23 RX ORDER — SODIUM CHLORIDE 9 MG/ML
INJECTION, SOLUTION INTRAVENOUS CONTINUOUS
Status: DISCONTINUED | OUTPATIENT
Start: 2022-11-23 | End: 2022-11-24 | Stop reason: HOSPADM

## 2022-11-23 RX ORDER — MUPIROCIN 20 MG/G
OINTMENT TOPICAL 2 TIMES DAILY
Status: DISCONTINUED | OUTPATIENT
Start: 2022-11-23 | End: 2022-11-24 | Stop reason: HOSPADM

## 2022-11-23 RX ADMIN — HEPARIN SODIUM 5000 UNITS: 5000 INJECTION INTRAVENOUS; SUBCUTANEOUS at 02:11

## 2022-11-23 RX ADMIN — MUPIROCIN: 20 OINTMENT TOPICAL at 10:11

## 2022-11-23 RX ADMIN — HEPARIN SODIUM 5000 UNITS: 5000 INJECTION INTRAVENOUS; SUBCUTANEOUS at 05:11

## 2022-11-23 RX ADMIN — SODIUM CHLORIDE: 0.9 INJECTION, SOLUTION INTRAVENOUS at 10:11

## 2022-11-23 RX ADMIN — OXYBUTYNIN CHLORIDE 5 MG: 5 TABLET ORAL at 02:11

## 2022-11-23 RX ADMIN — OXYBUTYNIN CHLORIDE 5 MG: 5 TABLET ORAL at 08:11

## 2022-11-23 RX ADMIN — HEPARIN SODIUM 5000 UNITS: 5000 INJECTION INTRAVENOUS; SUBCUTANEOUS at 10:11

## 2022-11-23 RX ADMIN — ACETAMINOPHEN 650 MG: 325 TABLET ORAL at 08:11

## 2022-11-23 RX ADMIN — CEFTRIAXONE 2 G: 2 INJECTION, POWDER, FOR SOLUTION INTRAMUSCULAR; INTRAVENOUS at 11:11

## 2022-11-23 RX ADMIN — MUPIROCIN: 20 OINTMENT TOPICAL at 09:11

## 2022-11-23 RX ADMIN — TAMSULOSIN HYDROCHLORIDE 0.4 MG: 0.4 CAPSULE ORAL at 08:11

## 2022-11-23 RX ADMIN — SODIUM CHLORIDE: 0.9 INJECTION, SOLUTION INTRAVENOUS at 09:11

## 2022-11-23 NOTE — PLAN OF CARE
[facility] - Med Surg  Discharge Final Note    Primary Care Provider: Primary Doctor No    Expected Discharge Date: 11/24/2022      All needs met. No needs. Patient to schedule appointment if needed. SW notified nurse Veronica that patient is ready for discharge from case management standpoint.   Final Discharge Note (most recent)       Final Note - 11/23/22 1312          Final Note    Assessment Type Final Discharge Note     Anticipated Discharge Disposition Home or Self Care     What phone number can be called within the next 1-3 days to see how you are doing after discharge? [phone]        Post-Acute Status    Post-Acute Authorization Other     Coverage Advantage Medicare     Other Status No Post-Acute Service Needs     Discharge Delays None known at this time                     Important Message from Medicare             Contact Info       No, Primary Doctor   Relationship: PCP - General        Next Steps: Follow up    [facility]    [address] [address]       Next Steps: Follow up    Instructions: Please call to schedule an appointment if needed.

## 2022-11-23 NOTE — PROGRESS NOTES
[facility] Medicine  Progress Note    Patient Name: William Cruz  MRN: [id number]  Patient Class: IP- Inpatient   Admission Date: 11/22/2022  Length of Stay: 1 days  Attending Physician: Garrick Garcia MD  Primary Care Provider: Primary Doctor No        Subjective:     Principal Problem:Pyelonephritis        HPI:  This is a 77 year old male with a PMHx of obstructive uropathy (with indwelling freire), HLD and nephrolithiasais s/p lithotripsy (11/15) who presents with right flank pain.     Patient reports developing right flank pain associated with nausea, vomiting/hematemesis that occurred on the day of presentation. He has been having intermittent similar episodes of flank pain. He tried taking Ibuprofen which helped with his symptoms. Associated symptoms include fevers, chills and vomiting. No other reported symptoms. In the ED, the patient was febrile (39.4), later became hypotensive (97/44). Labs showed leukocytosis (16.2), an elevated creatinine (1.5 - baseline of 1.0). UA showed +1 leukocytes, 80 WBCs, 20 RBCs, positive nitrites. CXR showed no acute process. US abdomen showed cholelithiasis without cholecystitis. CT A/P showed moderate right-sided hydronephrosis and hydroureter secondary to partially obstructing adjacent distal right ureteral calculi & small nonobstructing right renal calculi with mild right perinephric and periureteric inflammatory change may represent underlying UTI/pyelonephritis. He was given fluids, ceftriaxone and protonix, zofran and tylenol. The patient was admitted for further management.      Overview/Hospital Course:  Patient admitted to the hospital for R sided pyelonephritis. Patient went for stent placement on 11/22. Cultures were sent        Interval History: No new issues     Review of Systems   Constitutional:  Negative for activity change.   HENT:  Negative for congestion and dental problem.    Eyes:  Negative for discharge.   Respiratory:  Negative  for apnea.    Cardiovascular:  Negative for chest pain.   Gastrointestinal:  Negative for abdominal distention.   Endocrine: Negative for cold intolerance.   Genitourinary:  Negative for difficulty urinating.   Musculoskeletal:  Negative for arthralgias.   Neurological:  Negative for dizziness.   Psychiatric/Behavioral:  Negative for agitation.    Objective:     Vital Signs (Most Recent):  Temp: (!) 100.7 °F (38.2 °C) (11/23/22 0656)  Pulse: (!) 58 (11/23/22 0656)  Resp: 17 (11/23/22 0656)  BP: (!) 122/59 (11/23/22 0656)  SpO2: 95 % (11/23/22 0656)   Vital Signs (24h Range):  Temp:  [97.7 °F (36.5 °C)-100.7 °F (38.2 °C)] 100.7 °F (38.2 °C)  Pulse:  [54-66] 58  Resp:  [14-26] 17  SpO2:  [90 %-95 %] 95 %  BP: (100-129)/(51-60) 122/59     Weight: 86.2 kg (190 lb)  Body mass index is 26.5 kg/m².    Intake/Output Summary (Last 24 hours) at 11/23/2022 0933  Last data filed at 11/23/2022 0814  Gross per 24 hour   Intake 1402.03 ml   Output 1150 ml   Net 252.03 ml      Physical Exam  Vitals and nursing note reviewed.   Constitutional:       General: He is not in acute distress.     Appearance: He is not ill-appearing, toxic-appearing or diaphoretic.   HENT:      Head: Atraumatic.      Mouth/Throat:      Pharynx: Oropharynx is clear.   Eyes:      Conjunctiva/sclera: Conjunctivae normal.   Cardiovascular:      Rate and Rhythm: Normal rate and regular rhythm.   Pulmonary:      Effort: Pulmonary effort is normal. No respiratory distress.   Skin:     General: Skin is warm and dry.   Neurological:      Mental Status: He is alert and oriented to person, place, and time.   Psychiatric:         Behavior: Behavior normal.       Significant Labs: All pertinent labs within the past 24 hours have been reviewed.  BMP:   Recent Labs   Lab 11/22/22  0613   *      K 4.1      CO2 21*   BUN 23   CREATININE 1.9*   CALCIUM 8.2*   MG 1.6     CBC:   Recent Labs   Lab 11/21/22  1528 11/22/22  0613   WBC 16.20* 11.87   HGB 14.9  12.8*   HCT 45.7 40.2    203       Significant Imaging:       Assessment/Plan:      * Pyelonephritis  Presented with flank pain   Labs showed leukocytosis (16.2). UA showed +1 leukocytes, 80 WBCs, 20 RBCs, positive nitrites.   CT A/P showed moderate right-sided hydronephrosis and hydroureter secondary to partially obstructing adjacent distal right ureteral calculi & small nonobstructing right renal calculi with mild right perinephric and periureteric inflammatory change may represent underlying UTI/pyelonephritis.   Admitted for further management     Urine culture with gram negative.  Pending (S)     Plan:   - Ceftriaxone   - IVF  - F/U urine culture     SOL (acute kidney injury)  Patient with acute kidney injury likely due to pre-renal azotemia SOL is currently stable. Labs reviewed- Renal function/electrolytes with CrCl cannot be calculated (Unknown ideal weight.). according to latest data. Monitor urine output and serial BMP and adjust therapy as needed. Avoid nephrotoxins and renally dose meds for GFR listed above.     IVF  Monitor BMPs     Nephrolithiasis  Presented with flank pain   CT A/P showed moderate right-sided hydronephrosis and hydroureter secondary to partially obstructing adjacent distal right ureteral calculi & small nonobstructing right renal calculi with mild right perinephric and periureteric inflammatory change may represent underlying UTI/pyelonephritis.   Admitted for further management     Plan:   - Urology consult   - Pain control   - IVF     Hyperlipidemia  Resume statin       Obstructive uropathy  W/ chronic indwelling freire   Maintain freire catheter   Urology consult   Resume home Flomax       VTE Risk Mitigation (From admission, onward)         Ordered     heparin (porcine) injection 5,000 Units  Every 8 hours         11/22/22 8877     IP VTE HIGH RISK PATIENT  Once         11/22/22 6645     Place sequential compression device  Until discontinued         11/22/22 0656                 Discharge Planning   LINK:      Code Status: Full Code   Is the patient medically ready for discharge?:     Reason for patient still in hospital (select all that apply): Patient unstable  Discharge Plan A: Home                  Garrick Guerra MD  Department of Hospital Medicine   [facility]

## 2022-11-23 NOTE — ASSESSMENT & PLAN NOTE
Presented with flank pain   Labs showed leukocytosis (16.2). UA showed +1 leukocytes, 80 WBCs, 20 RBCs, positive nitrites.   CT A/P showed moderate right-sided hydronephrosis and hydroureter secondary to partially obstructing adjacent distal right ureteral calculi & small nonobstructing right renal calculi with mild right perinephric and periureteric inflammatory change may represent underlying UTI/pyelonephritis.   Admitted for further management     Urine culture with gram negative.  Pending (S)     Plan:   - Ceftriaxone   - IVF  - F/U urine culture

## 2022-11-23 NOTE — SUBJECTIVE & OBJECTIVE
Interval History: No new issues     Review of Systems   Constitutional:  Negative for activity change.   HENT:  Negative for congestion and dental problem.    Eyes:  Negative for discharge.   Respiratory:  Negative for apnea.    Cardiovascular:  Negative for chest pain.   Gastrointestinal:  Negative for abdominal distention.   Endocrine: Negative for cold intolerance.   Genitourinary:  Negative for difficulty urinating.   Musculoskeletal:  Negative for arthralgias.   Neurological:  Negative for dizziness.   Psychiatric/Behavioral:  Negative for agitation.    Objective:     Vital Signs (Most Recent):  Temp: (!) 100.7 °F (38.2 °C) (11/23/22 0656)  Pulse: (!) 58 (11/23/22 0656)  Resp: 17 (11/23/22 0656)  BP: (!) 122/59 (11/23/22 0656)  SpO2: 95 % (11/23/22 0656)   Vital Signs (24h Range):  Temp:  [97.7 °F (36.5 °C)-100.7 °F (38.2 °C)] 100.7 °F (38.2 °C)  Pulse:  [54-66] 58  Resp:  [14-26] 17  SpO2:  [90 %-95 %] 95 %  BP: (100-129)/(51-60) 122/59     Weight: 86.2 kg (190 lb)  Body mass index is 26.5 kg/m².    Intake/Output Summary (Last 24 hours) at 11/23/2022 0933  Last data filed at 11/23/2022 0814  Gross per 24 hour   Intake 1402.03 ml   Output 1150 ml   Net 252.03 ml      Physical Exam  Vitals and nursing note reviewed.   Constitutional:       General: He is not in acute distress.     Appearance: He is not ill-appearing, toxic-appearing or diaphoretic.   HENT:      Head: Atraumatic.      Mouth/Throat:      Pharynx: Oropharynx is clear.   Eyes:      Conjunctiva/sclera: Conjunctivae normal.   Cardiovascular:      Rate and Rhythm: Normal rate and regular rhythm.   Pulmonary:      Effort: Pulmonary effort is normal. No respiratory distress.   Skin:     General: Skin is warm and dry.   Neurological:      Mental Status: He is alert and oriented to person, place, and time.   Psychiatric:         Behavior: Behavior normal.       Significant Labs: All pertinent labs within the past 24 hours have been reviewed.  BMP:   Recent  Labs   Lab 11/22/22  0613   *      K 4.1      CO2 21*   BUN 23   CREATININE 1.9*   CALCIUM 8.2*   MG 1.6     CBC:   Recent Labs   Lab 11/21/22  1528 11/22/22 0613   WBC 16.20* 11.87   HGB 14.9 12.8*   HCT 45.7 40.2    203       Significant Imaging:

## 2022-11-23 NOTE — HOSPITAL COURSE
Patient admitted to the hospital for R sided pyelonephritis. Patient went for stent placement on 11/22. Cultures were sent and grew out Klebsiella.  Patient was discharged to home on 11/24 with Rx of Cipro. Activity as tolerated.Diet regular. Follow up with urology in 1-2 weeks.

## 2022-11-24 VITALS
WEIGHT: 190 LBS | RESPIRATION RATE: 20 BRPM | DIASTOLIC BLOOD PRESSURE: 60 MMHG | TEMPERATURE: 98 F | HEIGHT: 71 IN | SYSTOLIC BLOOD PRESSURE: 128 MMHG | HEART RATE: 54 BPM | OXYGEN SATURATION: 95 % | BODY MASS INDEX: 26.6 KG/M2

## 2022-11-24 PROBLEM — N12 PYELONEPHRITIS: Status: RESOLVED | Noted: 2022-11-22 | Resolved: 2022-11-24

## 2022-11-24 PROBLEM — N17.9 AKI (ACUTE KIDNEY INJURY): Status: RESOLVED | Noted: 2022-11-22 | Resolved: 2022-11-24

## 2022-11-24 PROBLEM — N20.0 NEPHROLITHIASIS: Status: RESOLVED | Noted: 2022-11-22 | Resolved: 2022-11-24

## 2022-11-24 PROBLEM — N13.9 OBSTRUCTIVE UROPATHY: Status: RESOLVED | Noted: 2022-11-22 | Resolved: 2022-11-24

## 2022-11-24 LAB
ANION GAP SERPL CALC-SCNC: 10 MMOL/L (ref 8–16)
BUN SERPL-MCNC: 18 MG/DL (ref 8–23)
CALCIUM SERPL-MCNC: 8.1 MG/DL (ref 8.7–10.5)
CHLORIDE SERPL-SCNC: 106 MMOL/L (ref 95–110)
CO2 SERPL-SCNC: 21 MMOL/L (ref 23–29)
CREAT SERPL-MCNC: 1 MG/DL (ref 0.5–1.4)
EST. GFR  (NO RACE VARIABLE): >60 ML/MIN/1.73 M^2
GLUCOSE SERPL-MCNC: 104 MG/DL (ref 70–110)
POCT GLUCOSE: 97 MG/DL (ref 70–110)
POTASSIUM SERPL-SCNC: 3.8 MMOL/L (ref 3.5–5.1)
SODIUM SERPL-SCNC: 137 MMOL/L (ref 136–145)

## 2022-11-24 PROCEDURE — 36415 COLL VENOUS BLD VENIPUNCTURE: CPT | Performed by: INTERNAL MEDICINE

## 2022-11-24 PROCEDURE — 80048 BASIC METABOLIC PNL TOTAL CA: CPT | Performed by: INTERNAL MEDICINE

## 2022-11-24 PROCEDURE — 25000003 PHARM REV CODE 250: Performed by: STUDENT IN AN ORGANIZED HEALTH CARE EDUCATION/TRAINING PROGRAM

## 2022-11-24 PROCEDURE — 25000003 PHARM REV CODE 250: Performed by: INTERNAL MEDICINE

## 2022-11-24 PROCEDURE — 25000003 PHARM REV CODE 250: Performed by: UROLOGY

## 2022-11-24 PROCEDURE — 63600175 PHARM REV CODE 636 W HCPCS: Performed by: UROLOGY

## 2022-11-24 RX ORDER — CIPROFLOXACIN 500 MG/1
500 TABLET ORAL 2 TIMES DAILY
Qty: 20 TABLET | Refills: 0 | Status: SHIPPED | OUTPATIENT
Start: 2022-11-24 | End: 2022-12-04

## 2022-11-24 RX ADMIN — SODIUM CHLORIDE: 0.9 INJECTION, SOLUTION INTRAVENOUS at 05:11

## 2022-11-24 RX ADMIN — CEFTRIAXONE 2 G: 2 INJECTION, POWDER, FOR SOLUTION INTRAMUSCULAR; INTRAVENOUS at 10:11

## 2022-11-24 RX ADMIN — ATORVASTATIN CALCIUM 40 MG: 40 TABLET, FILM COATED ORAL at 08:11

## 2022-11-24 RX ADMIN — HEPARIN SODIUM 5000 UNITS: 5000 INJECTION INTRAVENOUS; SUBCUTANEOUS at 05:11

## 2022-11-24 RX ADMIN — TAMSULOSIN HYDROCHLORIDE 0.4 MG: 0.4 CAPSULE ORAL at 08:11

## 2022-11-24 RX ADMIN — OXYBUTYNIN CHLORIDE 5 MG: 5 TABLET ORAL at 08:11

## 2022-11-24 RX ADMIN — MUPIROCIN: 20 OINTMENT TOPICAL at 08:11

## 2022-11-24 NOTE — PLAN OF CARE
Problem: Adult Inpatient Plan of Care  Goal: Plan of Care Review  Outcome: Met  Goal: Patient-Specific Goal (Individualized)  Outcome: Met  Goal: Absence of Hospital-Acquired Illness or Injury  Outcome: Met  Goal: Optimal Comfort and Wellbeing  Outcome: Met  Goal: Readiness for Transition of Care  Outcome: Met     Problem: Fluid and Electrolyte Imbalance (Acute Kidney Injury/Impairment)  Goal: Fluid and Electrolyte Balance  Outcome: Met     Problem: Oral Intake Inadequate (Acute Kidney Injury/Impairment)  Goal: Optimal Nutrition Intake  Outcome: Met     Problem: Renal Function Impairment (Acute Kidney Injury/Impairment)  Goal: Effective Renal Function  Outcome: Met     Problem: Fall Injury Risk  Goal: Absence of Fall and Fall-Related Injury  Outcome: Met     Problem: Infection  Goal: Absence of Infection Signs and Symptoms  Outcome: Met

## 2022-11-24 NOTE — PLAN OF CARE
Problem: Adult Inpatient Plan of Care  Goal: Plan of Care Review  Outcome: Ongoing, Progressing  Goal: Optimal Comfort and Wellbeing  Outcome: Ongoing, Progressing  Goal: Readiness for Transition of Care  Outcome: Ongoing, Progressing     Problem: Fluid and Electrolyte Imbalance (Acute Kidney Injury/Impairment)  Goal: Fluid and Electrolyte Balance  Outcome: Ongoing, Progressing     Problem: Oral Intake Inadequate (Acute Kidney Injury/Impairment)  Goal: Optimal Nutrition Intake  Outcome: Ongoing, Progressing     Problem: Renal Function Impairment (Acute Kidney Injury/Impairment)  Goal: Effective Renal Function  Outcome: Ongoing, Progressing     Problem: Fall Injury Risk  Goal: Absence of Fall and Fall-Related Injury  Outcome: Ongoing, Progressing     Problem: Infection  Goal: Absence of Infection Signs and Symptoms  Outcome: Ongoing, Progressing

## 2022-11-24 NOTE — SUBJECTIVE & OBJECTIVE
Interval History: No new issues     Review of Systems   Constitutional:  Negative for activity change.   HENT:  Negative for congestion and dental problem.    Eyes:  Negative for discharge.   Respiratory:  Negative for apnea.    Cardiovascular:  Negative for chest pain.   Gastrointestinal:  Negative for abdominal distention.   Endocrine: Negative for cold intolerance.   Genitourinary:  Negative for difficulty urinating.   Musculoskeletal:  Negative for arthralgias.   Neurological:  Negative for dizziness.   Psychiatric/Behavioral:  Negative for agitation.    Objective:     Vital Signs (Most Recent):  Temp: 98.1 °F (36.7 °C) (11/24/22 0804)  Pulse: (!) 54 (11/24/22 0804)  Resp: 20 (11/24/22 0804)  BP: 128/60 (11/24/22 0804)  SpO2: 95 % (11/24/22 0804)   Vital Signs (24h Range):  Temp:  [98.1 °F (36.7 °C)-99.8 °F (37.7 °C)] 98.1 °F (36.7 °C)  Pulse:  [51-60] 54  Resp:  [18-20] 20  SpO2:  [91 %-95 %] 95 %  BP: (111-149)/(55-69) 128/60     Weight: 86.2 kg (190 lb)  Body mass index is 26.5 kg/m².    Intake/Output Summary (Last 24 hours) at 11/24/2022 0928  Last data filed at 11/24/2022 0642  Gross per 24 hour   Intake 2884.31 ml   Output 1900 ml   Net 984.31 ml      Physical Exam  Vitals and nursing note reviewed.   Constitutional:       General: He is not in acute distress.     Appearance: He is not ill-appearing, toxic-appearing or diaphoretic.   HENT:      Head: Atraumatic.      Mouth/Throat:      Pharynx: Oropharynx is clear.   Eyes:      Conjunctiva/sclera: Conjunctivae normal.   Cardiovascular:      Rate and Rhythm: Normal rate and regular rhythm.   Pulmonary:      Effort: Pulmonary effort is normal. No respiratory distress.   Skin:     General: Skin is warm and dry.   Neurological:      Mental Status: He is alert and oriented to person, place, and time.   Psychiatric:         Behavior: Behavior normal.       Significant Labs: All pertinent labs within the past 24 hours have been reviewed.  BMP:   Recent Labs   Lab  11/24/22  0357         K 3.8      CO2 21*   BUN 18   CREATININE 1.0   CALCIUM 8.1*     CBC: No results for input(s): WBC, HGB, HCT, PLT in the last 48 hours.    Significant Imaging:

## 2022-11-24 NOTE — ASSESSMENT & PLAN NOTE
Presented with flank pain   Labs showed leukocytosis (16.2). UA showed +1 leukocytes, 80 WBCs, 20 RBCs, positive nitrites.   CT A/P showed moderate right-sided hydronephrosis and hydroureter secondary to partially obstructing adjacent distal right ureteral calculi & small nonobstructing right renal calculi with mild right perinephric and periureteric inflammatory change may represent underlying UTI/pyelonephritis.   Admitted for further management     Urine culture with gram negative.  Pending (S)     Plan:   - Ceftriaxone   - IVF  - F/U urine culture -Klebsiella

## 2022-11-24 NOTE — DISCHARGE SUMMARY
[facility] Medicine  Discharge Summary      Patient Name: William Cruz  MRN: [id number]  AUGUSTINE: [id number]  Patient Class: IP- Inpatient  Admission Date: 11/22/2022  Hospital Length of Stay: 2 days  Discharge Date and Time:  11/24/2022 9:31 AM  Attending Physician: Garrick Garcia MD   Discharging Provider: Garrick Garcia MD  Primary Care Provider: Primary Doctor No    Primary Care Team: Networked reference to record PCT     HPI:   This is a 77 year old male with a PMHx of obstructive uropathy (with indwelling freire), HLD and nephrolithiasais s/p lithotripsy (11/15) who presents with right flank pain.     Patient reports developing right flank pain associated with nausea, vomiting/hematemesis that occurred on the day of presentation. He has been having intermittent similar episodes of flank pain. He tried taking Ibuprofen which helped with his symptoms. Associated symptoms include fevers, chills and vomiting. No other reported symptoms. In the ED, the patient was febrile (39.4), later became hypotensive (97/44). Labs showed leukocytosis (16.2), an elevated creatinine (1.5 - baseline of 1.0). UA showed +1 leukocytes, 80 WBCs, 20 RBCs, positive nitrites. CXR showed no acute process. US abdomen showed cholelithiasis without cholecystitis. CT A/P showed moderate right-sided hydronephrosis and hydroureter secondary to partially obstructing adjacent distal right ureteral calculi & small nonobstructing right renal calculi with mild right perinephric and periureteric inflammatory change may represent underlying UTI/pyelonephritis. He was given fluids, ceftriaxone and protonix, zofran and tylenol. The patient was admitted for further management.      Procedure(s) (LRB):  CYSTOSCOPY, WITH URETERAL STENT INSERTION (Right)      Hospital Course:   Patient admitted to the hospital for R sided pyelonephritis. Patient went for stent placement on 11/22. Cultures were sent and grew out Klebsiella.   Patient was discharged to home on 11/24 with Rx of Cipro. Activity as tolerated.Diet regular. Follow up with urology in 1-2 weeks.         Goals of Care Treatment Preferences:  Code Status: Full Code      Consults:   Consults (From admission, onward)        Status Ordering Provider     Inpatient consult to Urology  Once        Provider:  KETAN Schmitz MD    Completed YAMIL CONROY          No new Assessment & Plan notes have been filed under this hospital service since the last note was generated.  Service: Hospital Medicine    Final Active Diagnoses:    Diagnosis Date Noted POA    Hyperlipidemia [E78.5] 11/22/2022 Yes      Problems Resolved During this Admission:    Diagnosis Date Noted Date Resolved POA    PRINCIPAL PROBLEM:  Pyelonephritis [N12] 11/22/2022 11/24/2022 Yes    Obstructive uropathy [N13.9] 11/22/2022 11/24/2022 Yes    Nephrolithiasis [N20.0] 11/22/2022 11/24/2022 Yes    SOL (acute kidney injury) [N17.9] 11/22/2022 11/24/2022 Yes       Discharged Condition: good    Disposition: Home or Self Care    Follow Up:   Follow-up Information     Primary Doctor No .           [facility] Follow up.    Why: Please call to schedule an appointment if needed.  Contact information:  [address] [facility] [address]                     Patient Instructions:   No discharge procedures on file.    Significant Diagnostic Studies:     Pending Diagnostic Studies:     None         Medications:  Reconciled Home Medications:      Medication List      START taking these medications    ciprofloxacin HCl 500 MG tablet  Commonly known as: CIPRO  Take 1 tablet (500 mg total) by mouth 2 (two) times daily. for 10 days        CONTINUE taking these medications    aspirin 81 MG EC tablet  Commonly known as: ECOTRIN  Take 81 mg by mouth once daily.     CRESTOR ORAL  Take by mouth every other day.     magnesium 200 mg Tab  Take 100 mg by mouth every morning.     tamsulosin 0.4 mg  Cap  Commonly known as: FLOMAX  Take by mouth once daily.            Indwelling Lines/Drains at time of discharge:   Lines/Drains/Airways     Drain  Duration                Ureteral Drain/Stent 11/22/22 1220 Right ureter 6 Fr. 1 day         Urethral Catheter 11/22/22 1221 Silicone;Non-latex 16 Fr. 1 day                Time spent on the discharge of patient: >35 minutes         Garrick Guerra MD  Department of Hospital Medicine  [facility]

## 2022-11-24 NOTE — PROGRESS NOTES
[facility] Medicine  Progress Note    Patient Name: William Cruz  MRN: [id number]  Patient Class: IP- Inpatient   Admission Date: 11/22/2022  Length of Stay: 2 days  Attending Physician: Garrick Garcia MD  Primary Care Provider: Primary Doctor No        Subjective:     Principal Problem:Pyelonephritis        HPI:  This is a 77 year old male with a PMHx of obstructive uropathy (with indwelling freire), HLD and nephrolithiasais s/p lithotripsy (11/15) who presents with right flank pain.     Patient reports developing right flank pain associated with nausea, vomiting/hematemesis that occurred on the day of presentation. He has been having intermittent similar episodes of flank pain. He tried taking Ibuprofen which helped with his symptoms. Associated symptoms include fevers, chills and vomiting. No other reported symptoms. In the ED, the patient was febrile (39.4), later became hypotensive (97/44). Labs showed leukocytosis (16.2), an elevated creatinine (1.5 - baseline of 1.0). UA showed +1 leukocytes, 80 WBCs, 20 RBCs, positive nitrites. CXR showed no acute process. US abdomen showed cholelithiasis without cholecystitis. CT A/P showed moderate right-sided hydronephrosis and hydroureter secondary to partially obstructing adjacent distal right ureteral calculi & small nonobstructing right renal calculi with mild right perinephric and periureteric inflammatory change may represent underlying UTI/pyelonephritis. He was given fluids, ceftriaxone and protonix, zofran and tylenol. The patient was admitted for further management.      Overview/Hospital Course:  Patient admitted to the hospital for R sided pyelonephritis. Patient went for stent placement on 11/22. Cultures were sent and grew out Klebsiella.  Patient was discharged to home on 11/24 with Rx of Cipro. Activity as tolerated.Diet regular. Follow up with urology in 1-2 weeks.        Interval History: No new issues     Review of Systems    Constitutional:  Negative for activity change.   HENT:  Negative for congestion and dental problem.    Eyes:  Negative for discharge.   Respiratory:  Negative for apnea.    Cardiovascular:  Negative for chest pain.   Gastrointestinal:  Negative for abdominal distention.   Endocrine: Negative for cold intolerance.   Genitourinary:  Negative for difficulty urinating.   Musculoskeletal:  Negative for arthralgias.   Neurological:  Negative for dizziness.   Psychiatric/Behavioral:  Negative for agitation.    Objective:     Vital Signs (Most Recent):  Temp: 98.1 °F (36.7 °C) (11/24/22 0804)  Pulse: (!) 54 (11/24/22 0804)  Resp: 20 (11/24/22 0804)  BP: 128/60 (11/24/22 0804)  SpO2: 95 % (11/24/22 0804)   Vital Signs (24h Range):  Temp:  [98.1 °F (36.7 °C)-99.8 °F (37.7 °C)] 98.1 °F (36.7 °C)  Pulse:  [51-60] 54  Resp:  [18-20] 20  SpO2:  [91 %-95 %] 95 %  BP: (111-149)/(55-69) 128/60     Weight: 86.2 kg (190 lb)  Body mass index is 26.5 kg/m².    Intake/Output Summary (Last 24 hours) at 11/24/2022 0928  Last data filed at 11/24/2022 0642  Gross per 24 hour   Intake 2884.31 ml   Output 1900 ml   Net 984.31 ml      Physical Exam  Vitals and nursing note reviewed.   Constitutional:       General: He is not in acute distress.     Appearance: He is not ill-appearing, toxic-appearing or diaphoretic.   HENT:      Head: Atraumatic.      Mouth/Throat:      Pharynx: Oropharynx is clear.   Eyes:      Conjunctiva/sclera: Conjunctivae normal.   Cardiovascular:      Rate and Rhythm: Normal rate and regular rhythm.   Pulmonary:      Effort: Pulmonary effort is normal. No respiratory distress.   Skin:     General: Skin is warm and dry.   Neurological:      Mental Status: He is alert and oriented to person, place, and time.   Psychiatric:         Behavior: Behavior normal.       Significant Labs: All pertinent labs within the past 24 hours have been reviewed.  BMP:   Recent Labs   Lab 11/24/22  0357         K 3.8       CO2 21*   BUN 18   CREATININE 1.0   CALCIUM 8.1*     CBC: No results for input(s): WBC, HGB, HCT, PLT in the last 48 hours.    Significant Imaging:       Assessment/Plan:      * Pyelonephritis  Presented with flank pain   Labs showed leukocytosis (16.2). UA showed +1 leukocytes, 80 WBCs, 20 RBCs, positive nitrites.   CT A/P showed moderate right-sided hydronephrosis and hydroureter secondary to partially obstructing adjacent distal right ureteral calculi & small nonobstructing right renal calculi with mild right perinephric and periureteric inflammatory change may represent underlying UTI/pyelonephritis.   Admitted for further management     Urine culture with gram negative.  Pending (S)     Plan:   - Ceftriaxone   - IVF  - F/U urine culture -Klebsiella    SOL (acute kidney injury)  Patient with acute kidney injury likely due to pre-renal azotemia SOL is currently stable. Labs reviewed- Renal function/electrolytes with CrCl cannot be calculated (Unknown ideal weight.). according to latest data. Monitor urine output and serial BMP and adjust therapy as needed. Avoid nephrotoxins and renally dose meds for GFR listed above.     IVF  Monitor BMPs     Nephrolithiasis  Presented with flank pain   CT A/P showed moderate right-sided hydronephrosis and hydroureter secondary to partially obstructing adjacent distal right ureteral calculi & small nonobstructing right renal calculi with mild right perinephric and periureteric inflammatory change may represent underlying UTI/pyelonephritis.   Admitted for further management     Plan:   - Urology consult   - Pain control   - IVF     Hyperlipidemia  Resume statin       Obstructive uropathy  W/ chronic indwelling freire   Maintain freire catheter   Urology consult   Resume home Flomax       VTE Risk Mitigation (From admission, onward)         Ordered     heparin (porcine) injection 5,000 Units  Every 8 hours         11/22/22 0515     IP VTE HIGH RISK PATIENT  Once         11/22/22  0459     Place sequential compression device  Until discontinued         11/22/22 0459                Discharge Planning   LINK: 11/24/2022     Code Status: Full Code   Is the patient medically ready for discharge?:     Reason for patient still in hospital (select all that apply):   Discharge Plan A: Home   Discharge Delays: None known at this time              Garrick Guerra MD  Department of Hospital Medicine   [facility]

## 2022-11-24 NOTE — NURSING
VSS on RA, NAD, Afebrile, voids per Loretta, ambulatory. Discharge instructions explained and handed to pt. Pt verbalizes understanding. IV access dc. Cardiac monitoring dc, monitor tech notified. [place] switched to leg bag. Discharged home via wheelchair, surgical mask in place.

## 2022-11-24 NOTE — PLAN OF CARE
[facility] - Med Surg  Discharge Final Note    Primary Care Provider: Primary Doctor No    Expected Discharge Date: 11/24/2022    Final Discharge Note (most recent)       Final Note - 11/24/22 1005          Final Note    Assessment Type Final Discharge Note     Anticipated Discharge Disposition Home or Self Care     What phone number can be called within the next 1-3 days to see how you are doing after discharge? [phone]     Hospital Resources/Appts/Education Provided Appointments scheduled and added to AVS;Appointments scheduled by Navigator/Coordinator        Post-Acute Status    Coverage Medicare Advantage     Other Status No Post-Acute Service Needs     Discharge Delays None known at this time                     Important Message from Medicare             Contact Info       [facility]    [address] [facility] [address]       Next Steps: Follow up    Instructions: Please call to schedule an appointment if needed.    KIANA Schmitz MD   Specialty: Urology    [address]  [address]  [facility] [address]   Phone: [phone]       Next Steps: Follow up    Instructions: They will call you to schedule first appointment.          LENCHO emailed Dr. Schmitz's office to contact patient with a follow up appointment.     LENCHO secure chat nurse that patient cleared from case management standpoint.

## 2022-11-28 ENCOUNTER — TELEPHONE (OUTPATIENT)
Dept: UROLOGY | Facility: CLINIC | Age: 77
End: 2022-11-28
Payer: MEDICARE

## 2022-11-28 NOTE — TELEPHONE ENCOUNTER
----- Message from Lion Adame MA sent at 11/25/2022  9:47 AM CST -----  Regarding: FW: F/U appointment  When should I get this patient in?  ----- Message -----  From: Geo Almaraz LCSW  Sent: 11/24/2022  10:01 AM CST  To: [facility] Staff  Subject: F/U appointment                                  Please contact patient to schedule a follow up hospital appointment.     Thanks  Geo

## 2022-11-29 NOTE — TELEPHONE ENCOUNTER
Spoke with pt, he informed me he will most likely follow up with his urologist in [place], as he is returning home in a few days.

## (undated) DEVICE — GOWN B1 X-LG X-LONG

## (undated) DEVICE — GLOVE SURG BIOGEL LATEX SZ 7.5

## (undated) DEVICE — WIRE GUIDE .035 150CM.

## (undated) DEVICE — GLOVE BIOGEL PI MICRO SZ 7

## (undated) DEVICE — SENSOR DUAL FLEX STR 150CM

## (undated) DEVICE — SUPPORT ULNA NERVE PROTECTOR

## (undated) DEVICE — COVER SNAP KAP 26IN

## (undated) DEVICE — SOL IRR NACL .9% 3000ML

## (undated) DEVICE — Device

## (undated) DEVICE — MAT QUICK 40X30 FLOOR FLUID LF